# Patient Record
Sex: FEMALE | NOT HISPANIC OR LATINO | Employment: OTHER | URBAN - METROPOLITAN AREA
[De-identification: names, ages, dates, MRNs, and addresses within clinical notes are randomized per-mention and may not be internally consistent; named-entity substitution may affect disease eponyms.]

---

## 2017-01-03 ENCOUNTER — DOCUMENTATION ONLY (OUTPATIENT)
Dept: LAB | Facility: CLINIC | Age: 57
End: 2017-01-03

## 2017-01-03 ENCOUNTER — OFFICE VISIT (OUTPATIENT)
Dept: FAMILY MEDICINE | Facility: CLINIC | Age: 57
End: 2017-01-03
Payer: COMMERCIAL

## 2017-01-03 VITALS
BODY MASS INDEX: 38.44 KG/M2 | DIASTOLIC BLOOD PRESSURE: 63 MMHG | HEART RATE: 80 BPM | OXYGEN SATURATION: 97 % | WEIGHT: 203.6 LBS | SYSTOLIC BLOOD PRESSURE: 125 MMHG | TEMPERATURE: 98.3 F | HEIGHT: 61 IN

## 2017-01-03 DIAGNOSIS — G44.219 EPISODIC TENSION-TYPE HEADACHE, NOT INTRACTABLE: ICD-10-CM

## 2017-01-03 DIAGNOSIS — Z12.31 ENCOUNTER FOR SCREENING MAMMOGRAM FOR BREAST CANCER: ICD-10-CM

## 2017-01-03 DIAGNOSIS — E03.9 ACQUIRED HYPOTHYROIDISM: ICD-10-CM

## 2017-01-03 DIAGNOSIS — F43.21 SITUATIONAL DEPRESSION: ICD-10-CM

## 2017-01-03 DIAGNOSIS — R51.9 WORSENING HEADACHES: Primary | ICD-10-CM

## 2017-01-03 DIAGNOSIS — E03.9 ACQUIRED HYPOTHYROIDISM: Primary | ICD-10-CM

## 2017-01-03 DIAGNOSIS — F43.21 GRIEVING: ICD-10-CM

## 2017-01-03 LAB — FOLATE SERPL-MCNC: 17.9 NG/ML

## 2017-01-03 PROCEDURE — 99214 OFFICE O/P EST MOD 30 MIN: CPT | Performed by: FAMILY MEDICINE

## 2017-01-03 PROCEDURE — 82607 VITAMIN B-12: CPT | Performed by: FAMILY MEDICINE

## 2017-01-03 PROCEDURE — 84443 ASSAY THYROID STIM HORMONE: CPT | Performed by: FAMILY MEDICINE

## 2017-01-03 PROCEDURE — 99000 SPECIMEN HANDLING OFFICE-LAB: CPT | Performed by: FAMILY MEDICINE

## 2017-01-03 PROCEDURE — 82746 ASSAY OF FOLIC ACID SERUM: CPT | Performed by: FAMILY MEDICINE

## 2017-01-03 PROCEDURE — 84439 ASSAY OF FREE THYROXINE: CPT | Performed by: FAMILY MEDICINE

## 2017-01-03 PROCEDURE — 36415 COLL VENOUS BLD VENIPUNCTURE: CPT | Performed by: FAMILY MEDICINE

## 2017-01-03 RX ORDER — NAPROXEN 500 MG/1
500 TABLET ORAL 2 TIMES DAILY PRN
Qty: 30 TABLET | Refills: 1 | Status: SHIPPED | OUTPATIENT
Start: 2017-01-03 | End: 2018-06-12

## 2017-01-03 ASSESSMENT — ANXIETY QUESTIONNAIRES
GAD7 TOTAL SCORE: 17
2. NOT BEING ABLE TO STOP OR CONTROL WORRYING: NEARLY EVERY DAY
7. FEELING AFRAID AS IF SOMETHING AWFUL MIGHT HAPPEN: NEARLY EVERY DAY
5. BEING SO RESTLESS THAT IT IS HARD TO SIT STILL: SEVERAL DAYS
IF YOU CHECKED OFF ANY PROBLEMS ON THIS QUESTIONNAIRE, HOW DIFFICULT HAVE THESE PROBLEMS MADE IT FOR YOU TO DO YOUR WORK, TAKE CARE OF THINGS AT HOME, OR GET ALONG WITH OTHER PEOPLE: SOMEWHAT DIFFICULT
6. BECOMING EASILY ANNOYED OR IRRITABLE: MORE THAN HALF THE DAYS
1. FEELING NERVOUS, ANXIOUS, OR ON EDGE: MORE THAN HALF THE DAYS
3. WORRYING TOO MUCH ABOUT DIFFERENT THINGS: NEARLY EVERY DAY

## 2017-01-03 ASSESSMENT — PATIENT HEALTH QUESTIONNAIRE - PHQ9: 5. POOR APPETITE OR OVEREATING: NEARLY EVERY DAY

## 2017-01-03 NOTE — PATIENT INSTRUCTIONS
Managing Tension-type Headache Symptoms  A tension-type headache can develop slowly. Being aware of the symptoms helps you recognize a headache early. Then you can act to reduce pain and relieve tension. Methods for relieving your symptoms include self-care and medicine.    Tension-type symptoms  The earlier you recognize the symptoms of a tension-type headache, the easier it is to treat. Tension-type headaches may:    Begin with fatigue, tension, or pain in the neck and shoulders    Feel like a band around the head    Be concentrated in the temple, the back of the head, behind the eyes, or in the face    Come and go, or last for days, weeks, or even longer    Involve referred pain -- this means that the area that hurts may not be where the problem begins  Self-care during a tension-type headache  When you have a tension-type headache, there are things you can do to relax, loosen muscles, and reduce the pain:    Brush your scalp lightly with a soft hairbrush.    Give yourself a massage. Knead the muscles running from your shoulders up the back of your skull. Or ask a friend to gently massage your neck and shoulders.    Use an ice pack. Wrap a thin cloth around a cold pack, a cold can of soda, or a bag of frozen vegetables. Apply this directly to the place where you feel pain (such as your neck or temples).    Use moist heat to relax your muscles. Take a warm shower or bath. Or drape a warm, moist towel around your neck and shoulders.  Relieving pain and tension  Over-the-counter or prescription medicine can help relieve pain. Another way to reduce your pain is to use relaxation techniques to loosen tight muscles.  Medicine  Medicine used for tension-type headaches include the following:    NSAIDs (nonsteroidal anti-inflammatories), such as aspirin and ibuprofen, relieve inflammation and help block pain signals.    Acetaminophen treats pain, and some formulations contain caffeine.     Muscle relaxants can reduce  painful muscle contractions.  Taking medicine safely  Be aware that:    Taking analgesics (pain relievers) or drinking too much coffee may lead to rebound headaches (frequent or severe headaches that can happen if you miss a dose of medication), so take pain medications only as needed. If you think you have these headaches, contact your health care provider.    Taking too much medication can cause sleep problems or stomach upset. Some over-the-counter headache medications may contain caffeine. These may disrupt sleep and worsen pain.  Relaxation techniques  A , class, book, or tape may help you learn these techniques. One or more of these methods may work for you:    Deep breathing. Slow, calm, deep breathing can help you relax. Breathe in for a count of 5 or more. Then slowly let the breath out.    Visualization. Imagining a peaceful, secure scene can give you a sense of control over your body and surroundings.    Progressive relaxation. This is done by tightening and then releasing muscle groups. Start at the top of your head and work your way down your body. Tighten each muscle group for 5 to 10 seconds. Then release the muscle group for the same amount of time.    Biofeedback. This is a type of training in which you learn to control certain physical functions and responses. This helps you learn to reduce muscle tension.       5281-6284 [employer] [address] [place] [address], [place] [address]. All rights reserved. This information is not intended as a substitute for professional medical care. Always follow your healthcare professional's instructions.

## 2017-01-03 NOTE — PROGRESS NOTES
SUBJECTIVE:                                                    Aziza Adan is a 56 year old female who presents to clinic today for the following health issues:      Headache     Onset: ongoing over the past x1 year    Description:   Location: left side back of of head   Character: sharp/ shooting pain  Frequency:  Lasting about x1 minute   Duration:  qx2-3d    Intensity: 9/10    Progression of Symptoms:  same    Accompanying Signs & Symptoms:  Stiff neck: no  Neck or upper back pain: no  Fever: no  Sinus pressure: no  Nausea or vomiting: no  Dizziness: no  Numbness: YES; occasional- right hand ongoing over the past x1 year   Weakness: no  Visual changes: sees a black spot/ blurry at random on side of left eye   History:   Head trauma: no  Family history of migraines: no  Previous tests for headaches: no  Neurologist evaluations: no  Able to do daily activities: yes  Wake with a headaches: no  Do headaches wake you up: no  Daily pain medication use: no  Work/school stressors/changes: YES- death of daughter x1 year ago; committed suicide- pt thinks it may be related. Also lost a son    Precipitating factors:   Does light make it worse: no  Does sound make it worse: no    Alleviating factors:  Does sleep help: no         Therapies Tried and outcome: none      Patient admits that she has been depressed for the past 2 or more years. Does not want to show her emotions to her other children.   Afraid of coming across as weak. Denies having any suicidal ideations.  PHQ-9 ([employer]) 1/3/2017   1.  Little interest or pleasure in doing things 1   2.  Feeling down, depressed, or hopeless 3   3.  Trouble falling or staying asleep, or sleeping too much 3   4.  Feeling tired or having little energy 3   5.  Poor appetite or overeating 1   6.  Feeling bad about yourself 2   7.  Trouble concentrating 0   8.  Moving slowly or restless 0   9.  Suicidal or self-harm thoughts 0   PHQ-9 Total Score 13   Difficulty at work, home,  "or with people Somewhat difficult     DK-7   [employer], 2002; Used with Permission) 1/3/2017   1. Feeling nervous, anxious, or on edge 2   2. Not being able to stop or control worrying 3   3. Worrying too much about different things 3   4. Trouble relaxing 3   5. Being so restless that it is hard to sit still 1   6. Becoming easily annoyed or irritable 2   7. Feeling afraid, as if something awful might happen 3   DK-7 Total Score 17   If you checked any problems, how difficult have they made it for you to do your work, take care of things at home, or get along with other people? Somewhat difficult         Problem list and histories reviewed & adjusted, as indicated.  Additional history: as documented    Current Outpatient Prescriptions   Medication Sig Dispense Refill     naproxen (NAPROSYN) 500 MG tablet Take 1 tablet (500 mg) by mouth 2 times daily as needed for moderate pain Take with meals 30 tablet 1     levothyroxine (SYNTHROID, LEVOTHROID) 200 MCG tablet Take 1 tablet (200 mcg) by mouth daily 90 tablet 3     simvastatin (ZOCOR) 80 MG tablet Take 1 tablet (80 mg) by mouth At Bedtime 90 tablet 3     [DISCONTINUED] LEVOTHYROXINE SODIUM PO Take 200 mcg by mouth       [DISCONTINUED] SIMVASTATIN PO Take 80 mg by mouth       Allergies   Allergen Reactions     Niacin And Related      \"Shaking all over and turned red\".     Problem list, Medication list, Allergies, and Medical/Social/Surgical histories reviewed in [facility] and updated as appropriate.    ROS:  Other than noted above, general, HEENT, respiratory, cardiac and gastrointestinal systems are negative.      OBJECTIVE:                                                    /63 mmHg  Pulse 80  Temp(Src) 98.3  F (36.8  C) (Tympanic)  Ht 5' 1\" (1.549 m)  Wt 203 lb 9.6 oz (92.352 kg)  BMI 38.49 kg/m2  SpO2 97%  Breastfeeding? No  Body mass index is 38.49 kg/(m^2).  GENERAL: healthy, alert and no distress  RESP: lungs clear to auscultation - no rales, " rhonchi or wheezes  CV: regular rate and rhythm, normal S1 S2, no S3 or S4, no murmur, click or rub, no peripheral edema and peripheral pulses strong  NEURO: CN II -XII grossly intact. No neurologic deficits noted. DTR are present and equal bilaterally.      Diagnostic Test Results:  See orders below     ASSESSMENT/PLAN:                                                    Aziza was seen today for headache.    Diagnoses and all orders for this visit:    Worsening headaches  Since patient is > 50 years, will obtain an MRI to further evaluate  -     MR Brain w/o Contrast; Future    Episodic tension-type headache, not intractable  -     Trial naproxen (NAPROSYN) 500 MG tablet; Take 1 tablet (500 mg) by mouth 2 times daily as needed for moderate pain Take with meals  Avoid taking daily to prevent rebound headaches. Patient verbalized understanding.  Patient education and Handout given    Grieving > 1 year  -     Vitamin B12  -     Folate  -     MENTAL HEALTH REFERRAL    Situational depression/anxiety due to loss of daughter and son  -     MENTAL HEALTH REFERRAL    Encounter for screening mammogram for breast cancer  -     *MA Screening Digital Bilateral; Future    Follow up after results are back, sooner if needed.      Total time spent face to face was 30 min. Greater than 50% of the time was spent counseling and/OR coordination of care regarding today's complaints and symptoms and discussing the treatment plan as described immediately above.      Alicia Cárdenas MD  [facility]

## 2017-01-03 NOTE — MR AVS SNAPSHOT
After Visit Summary   1/3/2017    Aziza Adan    MRN: [id number]           Patient Information     Date Of Birth          1960        Visit Information        Provider Department      1/3/2017 2:00 PM Alicia Cárdenas MD [facility]        Today's Diagnoses     Worsening headaches    -  1     Episodic tension-type headache, not intractable         Grieving         Situational depression         Encounter for screening mammogram for breast cancer           Care Instructions      Managing Tension-type Headache Symptoms  A tension-type headache can develop slowly. Being aware of the symptoms helps you recognize a headache early. Then you can act to reduce pain and relieve tension. Methods for relieving your symptoms include self-care and medicine.    Tension-type symptoms  The earlier you recognize the symptoms of a tension-type headache, the easier it is to treat. Tension-type headaches may:    Begin with fatigue, tension, or pain in the neck and shoulders    Feel like a band around the head    Be concentrated in the temple, the back of the head, behind the eyes, or in the face    Come and go, or last for days, weeks, or even longer    Involve referred pain -- this means that the area that hurts may not be where the problem begins  Self-care during a tension-type headache  When you have a tension-type headache, there are things you can do to relax, loosen muscles, and reduce the pain:    Brush your scalp lightly with a soft hairbrush.    Give yourself a massage. Knead the muscles running from your shoulders up the back of your skull. Or ask a friend to gently massage your neck and shoulders.    Use an ice pack. Wrap a thin cloth around a cold pack, a cold can of soda, or a bag of frozen vegetables. Apply this directly to the place where you feel pain (such as your neck or temples).    Use moist heat to relax your muscles. Take a warm shower or bath. Or drape a warm, moist  towel around your neck and shoulders.  Relieving pain and tension  Over-the-counter or prescription medicine can help relieve pain. Another way to reduce your pain is to use relaxation techniques to loosen tight muscles.  Medicine  Medicine used for tension-type headaches include the following:    NSAIDs (nonsteroidal anti-inflammatories), such as aspirin and ibuprofen, relieve inflammation and help block pain signals.    Acetaminophen treats pain, and some formulations contain caffeine.     Muscle relaxants can reduce painful muscle contractions.  Taking medicine safely  Be aware that:    Taking analgesics (pain relievers) or drinking too much coffee may lead to rebound headaches (frequent or severe headaches that can happen if you miss a dose of medication), so take pain medications only as needed. If you think you have these headaches, contact your health care provider.    Taking too much medication can cause sleep problems or stomach upset. Some over-the-counter headache medications may contain caffeine. These may disrupt sleep and worsen pain.  Relaxation techniques  A , class, book, or tape may help you learn these techniques. One or more of these methods may work for you:    Deep breathing. Slow, calm, deep breathing can help you relax. Breathe in for a count of 5 or more. Then slowly let the breath out.    Visualization. Imagining a peaceful, secure scene can give you a sense of control over your body and surroundings.    Progressive relaxation. This is done by tightening and then releasing muscle groups. Start at the top of your head and work your way down your body. Tighten each muscle group for 5 to 10 seconds. Then release the muscle group for the same amount of time.    Biofeedback. This is a type of training in which you learn to control certain physical functions and responses. This helps you learn to reduce muscle tension.       6234-3937 [employer]. [address],  [place] [address]. All rights reserved. This information is not intended as a substitute for professional medical care. Always follow your healthcare professional's instructions.              Follow-ups after your visit        Additional Services     MENTAL HEALTH REFERRAL       Your provider has referred you to: Behavioral Healthcare Providers Intake Scheduling [phone]   [email or web address]    All scheduling is subject to the client's specific insurance plan & benefits, provider/location availability, and provider clinical specialities.  Please arrive 15 minutes early for your first appointment and bring your completed paperwork.    Please be aware that coverage of these services is subject to the terms and limitations of your health insurance plan.  Call member services at your health plan with any benefit or coverage questions.                  Follow-up notes from your care team     Return for Follow up after results are back.      Future tests that were ordered for you today     Open Future Orders        Priority Expected Expires Ordered    *MA Screening Digital Bilateral Routine  1/3/2018 1/3/2017    MR Brain w/o Contrast Routine  1/4/2018 1/3/2017            Who to contact     Normal or non-critical lab and imaging results will be communicated to you by [employer], letter or phone within 4 business days after the clinic has received the results. If you do not hear from us within 7 days, please contact the clinic through [employer] or phone. If you have a critical or abnormal lab result, we will notify you by phone as soon as possible.  Submit refill requests through [employer] or call your pharmacy and they will forward the refill request to us. Please allow 3 business days for your refill to be completed.          If you need to speak with a  for additional information , please call: [phone]             Additional Information About Your Visit        [employer] Information     Michael lets  "you send messages to your doctor, view your test results, renew your prescriptions, schedule appointments and more. To sign up, go to [email or web address] . Click on \"Log in\" on the left side of the screen, which will take you to the Welcome page. Then click on \"Sign up Now\" on the right side of the page.     You will be asked to enter the access code listed below, as well as some personal information. Please follow the directions to create your username and password.     Your access code is: [email or web address]  Expires: 4/3/2017  2:58 PM     Your access code will  in 90 days. If you need help or a new code, please call your [facility] clinic or [phone].        Care EveryWhere ID     This is your Care EveryWhere ID. This could be used by other organizations to access your [facility] medical records  [id number]        Your Vitals Were     Pulse Temperature Height BMI (Body Mass Index) Pulse Oximetry Breastfeeding?    80 98.3  F (36.8  C) (Tympanic) 5' 1\" (1.549 m) 38.49 kg/m2 97% No       Blood Pressure from Last 3 Encounters:   17 125/63   16 104/60   11 97/60    Weight from Last 3 Encounters:   17 203 lb 9.6 oz (92.352 kg)   16 198 lb (89.812 kg)              We Performed the Following     Folate     MENTAL HEALTH REFERRAL     Vitamin B12          Today's Medication Changes          These changes are accurate as of: 1/3/17  2:58 PM.  If you have any questions, ask your nurse or doctor.               Start taking these medicines.        Dose/Directions    naproxen 500 MG tablet   Commonly known as:  NAPROSYN   Used for:  Episodic tension-type headache, not intractable   Started by:  Alicia Cárdenas MD        Dose:  500 mg   Take 1 tablet (500 mg) by mouth 2 times daily as needed for moderate pain Take with meals   Quantity:  30 tablet   Refills:  1            Where to get your medicines      These medications were sent to [facility]/pharmacy #[id number] - [place], [place] - [address] [place] [address] AT " [address] [place] [address]     Phone:  [phone]    - naproxen 500 MG tablet             Primary Care Provider Office Phone # Fax #    Blanca Peoples NICOLAS [phone]       [facility] [address] [place] [address]        Thank you!     Thank you for choosing [facility] [place]  for your care. Our goal is always to provide you with excellent care. Hearing back from our patients is one way we can continue to improve our services. Please take a few minutes to complete the written survey that you may receive in the mail after your visit with us. Thank you!             Your Updated Medication List - Protect others around you: Learn how to safely use, store and throw away your medicines at www.disposemymeds.org.          This list is accurate as of: 1/3/17  2:58 PM.  Always use your most recent med list.                   Brand Name Dispense Instructions for use    levothyroxine 200 MCG tablet    SYNTHROID/LEVOTHROID    90 tablet    Take 1 tablet (200 mcg) by mouth daily       naproxen 500 MG tablet    NAPROSYN    30 tablet    Take 1 tablet (500 mg) by mouth 2 times daily as needed for moderate pain Take with meals       simvastatin 80 MG tablet    ZOCOR    90 tablet    Take 1 tablet (80 mg) by mouth At Bedtime

## 2017-01-03 NOTE — NURSING NOTE
"Chief Complaint   Patient presents with     Headache       Initial /63 mmHg  Pulse 80  Temp(Src) 98.3  F (36.8  C) (Tympanic)  Ht 5' 1\" (1.549 m)  Wt 203 lb 9.6 oz (92.352 kg)  BMI 38.49 kg/m2  SpO2 97%  Breastfeeding? No Estimated body mass index is 38.49 kg/(m^2) as calculated from the following:    Height as of this encounter: 5' 1\" (1.549 m).    Weight as of this encounter: 203 lb 9.6 oz (92.352 kg).  BP completed using cuff size: del Pan MA      "

## 2017-01-04 LAB
T4 FREE SERPL-MCNC: 1.57 NG/DL (ref 0.76–1.46)
TSH SERPL DL<=0.005 MIU/L-ACNC: 0.15 MU/L (ref 0.4–4)

## 2017-01-04 ASSESSMENT — PATIENT HEALTH QUESTIONNAIRE - PHQ9: SUM OF ALL RESPONSES TO PHQ QUESTIONS 1-9: 13

## 2017-01-04 ASSESSMENT — ANXIETY QUESTIONNAIRES: GAD7 TOTAL SCORE: 17

## 2017-01-06 LAB — VIT B12 SERPL-MCNC: 497 PG/ML (ref 193–986)

## 2017-01-09 DIAGNOSIS — E03.9 ACQUIRED HYPOTHYROIDISM: Primary | ICD-10-CM

## 2017-01-09 RX ORDER — LEVOTHYROXINE SODIUM 175 UG/1
175 TABLET ORAL DAILY
Qty: 60 TABLET | Refills: 0 | Status: SHIPPED | OUTPATIENT
Start: 2017-01-09 | End: 2017-03-27

## 2017-01-17 ENCOUNTER — RADIANT APPOINTMENT (OUTPATIENT)
Dept: MAMMOGRAPHY | Facility: CLINIC | Age: 57
End: 2017-01-17
Attending: FAMILY MEDICINE
Payer: COMMERCIAL

## 2017-01-17 DIAGNOSIS — Z12.31 VISIT FOR SCREENING MAMMOGRAM: ICD-10-CM

## 2017-01-17 PROCEDURE — G0202 SCR MAMMO BI INCL CAD: HCPCS | Mod: TC

## 2017-01-31 DIAGNOSIS — E78.5 HYPERLIPIDEMIA WITH TARGET LDL LESS THAN 160: ICD-10-CM

## 2017-01-31 NOTE — TELEPHONE ENCOUNTER
Panel Management Review      Patient has the following on her problem list: None      Composite cancer screening  Chart review shows that this patient is due/due soon for the following Mammogram and Colonoscopy  Summary:    Patient is due/failing the following:   COLONOSCOPY and MAMMOGRAM    Action needed:   Patient needs office visit for colonoscopy- mammo was completed 1/17/17.    Type of outreach:    Phone, left message for patient to call back.      Left message for patient to call back pod 2 line.([phone])      Questions for provider review:    None                                                                                                                                    Laura Pan MA       Chart routed to Care Team .

## 2017-01-31 NOTE — LETTER
[facility]  [address]  [place] [address]  [phone]        Aziza Adan  [address] [address]      February 27, 2017      Dear Aziza,      Refill request for Simvastatin received. You are on a on high dose Simvastatin. However, you are overdue for fasting labs. Will await results prior to simvastatin refill. You may need a lower dose.    Please schedule this visit at your earliest convenience.      Sincerely,    Dr. Cárdenas's Care Team

## 2017-02-20 RX ORDER — SIMVASTATIN 80 MG
80 TABLET ORAL AT BEDTIME
Qty: 90 TABLET | Refills: 3 | Status: CANCELLED | OUTPATIENT
Start: 2017-02-20

## 2017-02-20 NOTE — TELEPHONE ENCOUNTER
Lost FIT supplies- will return to clinic to  new supplies.      Dr. Cárdenas:  Pt is requesting refill.  Medication was pended- please advise.

## 2017-02-20 NOTE — TELEPHONE ENCOUNTER
Noted.   On high dose Simvastatin.   Overdue for a lipid profile. Will await results prior to Simvastatin refill. May need a lower dose.   Future lab order completed. Patient to schedule a fasting lab appt at earliest convenience.

## 2017-03-27 DIAGNOSIS — E03.9 ACQUIRED HYPOTHYROIDISM: ICD-10-CM

## 2017-03-27 DIAGNOSIS — E78.5 HYPERLIPIDEMIA WITH TARGET LDL LESS THAN 160: ICD-10-CM

## 2017-03-27 NOTE — TELEPHONE ENCOUNTER
Routing refill request to provider for review/approval because:  Labs out of range.          Notes Recorded by Alicia Cárdenas MD on 1/9/2017 at 12:33 PM  Patient called with results.   Normal folate and Vitamin B 12.   Low TSH with high free T4 on Levo 200 mcg/day.   Recommended to decrease dose and repeat dose in 4-6 weeks.   Patient verbalized understanding.  Rx sent for Levo 175 mcg/day and labs pended for future.

## 2017-03-27 NOTE — TELEPHONE ENCOUNTER
LEVOTHYROXINE     Last Written Prescription Date: 2-12-17  Last Quantity: 60, # refills: 0  Last Office Visit with [facility] [place], [facility] or [facility] prescribing provider: 1-3-17        TSH   Date Value Ref Range Status   01/03/2017 0.15 (L) 0.40 - 4.00 mU/L Final

## 2017-03-29 NOTE — TELEPHONE ENCOUNTER
Please let patient know to schedule a lab appt before refill is completed.   Future Lab order was previously completed

## 2017-03-29 NOTE — TELEPHONE ENCOUNTER
Spoke with pt and let her know she needs to come in for labs before refill. Made appointment for tomorrow at 9:15, informed she needs to be fasting. Pt verbalized understanding and appreciation for the call.   Jacki Mcdaniel RN

## 2017-03-30 DIAGNOSIS — E03.9 ACQUIRED HYPOTHYROIDISM: ICD-10-CM

## 2017-03-30 DIAGNOSIS — E78.5 HYPERLIPIDEMIA WITH TARGET LDL LESS THAN 160: ICD-10-CM

## 2017-03-30 LAB
CHOLEST SERPL-MCNC: 231 MG/DL
HDLC SERPL-MCNC: 46 MG/DL
LDLC SERPL CALC-MCNC: 157 MG/DL
NONHDLC SERPL-MCNC: 185 MG/DL
TRIGL SERPL-MCNC: 140 MG/DL
TSH SERPL DL<=0.005 MIU/L-ACNC: 0.64 MU/L (ref 0.4–4)

## 2017-03-30 PROCEDURE — 36415 COLL VENOUS BLD VENIPUNCTURE: CPT | Performed by: FAMILY MEDICINE

## 2017-03-30 PROCEDURE — 80061 LIPID PANEL: CPT | Performed by: FAMILY MEDICINE

## 2017-03-30 PROCEDURE — 84443 ASSAY THYROID STIM HORMONE: CPT | Performed by: FAMILY MEDICINE

## 2017-03-31 RX ORDER — LEVOTHYROXINE SODIUM 175 UG/1
175 TABLET ORAL DAILY
Qty: 90 TABLET | Refills: 3 | Status: SHIPPED | OUTPATIENT
Start: 2017-03-31 | End: 2018-03-29

## 2017-04-02 ENCOUNTER — TRANSFERRED RECORDS (OUTPATIENT)
Dept: HEALTH INFORMATION MANAGEMENT | Facility: CLINIC | Age: 57
End: 2017-04-02

## 2017-04-04 RX ORDER — PRAVASTATIN SODIUM 40 MG
40 TABLET ORAL DAILY
Qty: 90 TABLET | Refills: 3 | Status: SHIPPED | OUTPATIENT
Start: 2017-04-04 | End: 2018-04-02

## 2017-04-12 NOTE — PATIENT INSTRUCTIONS
Schedule fasting lab in 3 months to recheck Cholesterol.     Preventive Health Recommendations  Female Ages 50 - 64    Yearly exam: See your health care provider every year in order to  o Review health changes.   o Discuss preventive care.    o Review your medicines if your doctor has prescribed any.      Get a Pap test every three years (unless you have an abnormal result and your provider advises testing more often).    If you get Pap tests with HPV test, you only need to test every 5 years, unless you have an abnormal result.     You do not need a Pap test if your uterus was removed (hysterectomy) and you have not had cancer.    You should be tested each year for STDs (sexually transmitted diseases) if you're at risk.     Have a mammogram every 1 to 2 years.    Have a colonoscopy at age 50, or have a yearly FIT test (stool test). These exams screen for colon cancer.      Have a cholesterol test every 5 years, or more often if advised.    Have a diabetes test (fasting glucose) every three years. If you are at risk for diabetes, you should have this test more often.     If you are at risk for osteoporosis (brittle bone disease), think about having a bone density scan (DEXA).    Shots: Get a flu shot each year. Get a tetanus shot every 10 years.    Nutrition:     Eat at least 5 servings of fruits and vegetables each day.    Eat whole-grain bread, whole-wheat pasta and brown rice instead of white grains and rice.    Talk to your provider about Calcium and Vitamin D.     Lifestyle    Exercise at least 150 minutes a week (30 minutes a day, 5 days a week). This will help you control your weight and prevent disease.    Limit alcohol to one drink per day.    No smoking.     Wear sunscreen to prevent skin cancer.     See your dentist every six months for an exam and cleaning.    See your eye doctor every 1 to 2 years.

## 2017-04-12 NOTE — PROGRESS NOTES
SUBJECTIVE:     CC: Aziza Adan is an 56 year old woman who presents for preventive health visit.     Healthy Habits:    Do you get at least three servings of calcium containing foods daily (dairy, green leafy vegetables, etc.)? yes    Amount of exercise or daily activities, outside of work: 2-3 day(s) per week    Problems taking medications regularly No    Medication side effects: No    Have you had an eye exam in the past two years? yes    Do you see a dentist twice per year? yes    Do you have sleep apnea, excessive snoring or daytime drowsiness?no      Would like to discuss referral for nutritionist . Interested in losing weight.     Wt Readings from Last 4 Encounters:   04/13/17 201 lb 3.2 oz (91.3 kg)   01/03/17 203 lb 9.6 oz (92.4 kg)   01/29/16 198 lb (89.8 kg)         Patient informed that anything we discuss that is not related to preventative medicine, may be billed for; patient verbalizes understanding.      Today's PHQ-2 Score:   PHQ-2 ( 1999 [employer]) 4/12/2017 1/29/2016   Q1: Little interest or pleasure in doing things 1 3   Q2: Feeling down, depressed or hopeless 1 3   PHQ-2 Score 2 6       Abuse: Current or Past(Physical, Sexual or Emotional)- No  Do you feel safe in your environment - Yes    Social History   Substance Use Topics     Smoking status: Never Smoker     Smokeless tobacco: Never Used     Alcohol use No     The patient does not drink >3 drinks per day nor >7 drinks per week.    Recent Labs   Lab Test  03/30/17   0906  01/29/16   1110   CHOL  231*  155   HDL  46*  44*   LDL  157*  92   TRIG  140  96   NHDL  185*  111       Reviewed orders with patient.  Reviewed health maintenance and updated orders accordingly - Yes    Mammo Decision Support:  Patient over age 50, mutual decision to screen reflected in health maintenance.    Pertinent mammograms are reviewed under the imaging tab.  History of abnormal Pap smear:   NO - age 30- 65 PAP every 3 years recommended  Last 3 Pap  Results:   PAP (no units)   Date Value   2016 NIL       Reviewed and updated as needed this visit by clinical staff  Tobacco  Allergies  Meds  Problems         Reviewed and updated as needed this visit by Provider  Allergies  Meds  Problems        Past Medical History:   Diagnosis Date     Adjustment disorder with depressed mood      Hyperlipidemia LDL goal <130      Hypothyroidism (acquired)       Past Surgical History:   Procedure Laterality Date     NO HISTORY OF SURGERY       Obstetric History      T0      TAB0   SAB0   E0   M0   L8       # Outcome Date GA Lbr Og/2nd Weight Sex Delivery Anes PTL Lv   10 Para            9 Para            8 Para            7 Para            6 Para            5 Para            4 Para            3 Para            2 Para            1 Para                   ROS:  C: NEGATIVE for fever, chills, change in weight  I: NEGATIVE for worrisome rashes, moles or lesions  E: NEGATIVE for vision changes or irritation  ENT: NEGATIVE for ear, mouth and throat problems  R: NEGATIVE for significant cough or SOB  B: NEGATIVE for masses, tenderness or discharge  CV: NEGATIVE for chest pain, palpitations or peripheral edema  GI: NEGATIVE for nausea, abdominal pain, heartburn, or change in bowel habits  : NEGATIVE for unusual urinary or vaginal symptoms. Periods are regular.  M: NEGATIVE for significant arthralgias or myalgia  N: NEGATIVE for weakness, dizziness or paresthesias  P: NEGATIVE for changes in mood or affect    Current Outpatient Prescriptions   Medication Sig Dispense Refill     pravastatin (PRAVACHOL) 40 MG tablet Take 1 tablet (40 mg) by mouth daily 90 tablet 3     levothyroxine (SYNTHROID/LEVOTHROID) 175 MCG tablet Take 1 tablet (175 mcg) by mouth daily 90 tablet 3     naproxen (NAPROSYN) 500 MG tablet Take 1 tablet (500 mg) by mouth 2 times daily as needed for moderate pain Take with meals (Patient not taking: Reported on 2017) 30 tablet 1  "    Allergies   Allergen Reactions     Niacin And Related      \"Shaking all over and turned red\".     OBJECTIVE:     /76  Pulse 69  Temp 98.5  F (36.9  C) (Tympanic)  Ht 5' 1\" (1.549 m)  Wt 201 lb 3.2 oz (91.3 kg)  SpO2 97%  Breastfeeding? No  BMI 38.02 kg/m2  EXAM:  GENERAL: healthy, alert and no distress  EYES: Eyes grossly normal to inspection, PERRL and conjunctivae and sclerae normal  HENT: ear canals and TM's normal, nose and mouth without ulcers or lesions  NECK: no adenopathy, no asymmetry, masses, or scars and thyroid normal to palpation  RESP: lungs clear to auscultation - no rales, rhonchi or wheezes  BREAST: normal without masses, tenderness or nipple discharge and no palpable axillary masses or adenopathy  CV: regular rate and rhythm, normal S1 S2, no S3 or S4, no murmur, click or rub, no peripheral edema and peripheral pulses strong  ABDOMEN: soft, nontender, no hepatosplenomegaly, no masses and bowel sounds normal  MS: no gross musculoskeletal defects noted, no edema  SKIN: no suspicious lesions or rashes  NEURO: Normal strength and tone, mentation intact and speech normal  PSYCH: mentation appears normal, affect normal/bright    DATA  Reviewed and discussed with patient prior to discharge.  Results for orders placed or performed in visit on 03/30/17   TSH with free T4 reflex   Result Value Ref Range    TSH 0.64 0.40 - 4.00 mU/L   Lipid Profile   Result Value Ref Range    Cholesterol 231 (H) <200 mg/dL    Triglycerides 140 <150 mg/dL    HDL Cholesterol 46 (L) >49 mg/dL    LDL Cholesterol Calculated 157 (H) <100 mg/dL    Non HDL Cholesterol 185 (H) <130 mg/dL       ASSESSMENT/PLAN:     Aziza was seen today for physical.    Diagnoses and all orders for this visit:    Routine general medical examination at a health care facility    Need for hepatitis C screening test  -     **Hepatitis C Screen Reflex to RNA FUTURE anytime    Screen for colon cancer  -     Fecal colorectal cancer screen " "(FIT); Future    Screening for diabetes mellitus  -     Hemoglobin A1c    Hyperlipidemia LDL goal <130, on Pravastatin  -     Lipid Profile; Future in 3 months   -     Comprehensive metabolic panel; Future    History of vitamin D deficiency  -     Vitamin D Deficiency    Adjustment disorder with Depressed mood.   Recently completed PHQ/DK 7, see Epic for details  Declines Psychotherapy counseling or pharmacotherapy.   States that she is dealing with it by encouraging herself with [employer]      Obesity with body mass index of 30.0-39.9  Weight management plan: Discussed healthy diet and exercise guidelines and patient will follow up in 6 months in clinic to re-evaluate. Nutrition referral completed  -     NUTRITION REFERRAL    Advanced directives, counseling/discussion  -     HONORING CHOICES REFERRAL              COUNSELING:   Reviewed preventive health counseling, as reflected in patient instructions       Regular exercise       Healthy diet/nutrition         reports that she has never smoked. She has never used smokeless tobacco.    Estimated body mass index is 38.02 kg/(m^2) as calculated from the following:    Height as of this encounter: 5' 1\" (1.549 m).    Weight as of this encounter: 201 lb 3.2 oz (91.3 kg).   Weight management plan: Discussed healthy diet and exercise guidelines and patient will follow up in 6 months in clinic to re-evaluate. Nutrition referral completed    Counseling Resources:  ATP IV Guidelines  Pooled Cohorts Equation Calculator  Breast Cancer Risk Calculator  FRAX Risk Assessment  ICSI Preventive Guidelines  Dietary Guidelines for Americans, 2010  USDA's MyPlate  ASA Prophylaxis  Lung CA Screening    Follow up annually and as needed thoughout the year.    Alicia Cárdenas MD  [facility] [place]  "

## 2017-04-13 ENCOUNTER — OFFICE VISIT (OUTPATIENT)
Dept: FAMILY MEDICINE | Facility: CLINIC | Age: 57
End: 2017-04-13
Payer: COMMERCIAL

## 2017-04-13 VITALS
SYSTOLIC BLOOD PRESSURE: 121 MMHG | OXYGEN SATURATION: 97 % | DIASTOLIC BLOOD PRESSURE: 76 MMHG | HEART RATE: 69 BPM | HEIGHT: 61 IN | TEMPERATURE: 98.5 F | WEIGHT: 201.2 LBS | BODY MASS INDEX: 37.99 KG/M2

## 2017-04-13 DIAGNOSIS — E78.5 HYPERLIPIDEMIA LDL GOAL <130: ICD-10-CM

## 2017-04-13 DIAGNOSIS — Z11.59 NEED FOR HEPATITIS C SCREENING TEST: ICD-10-CM

## 2017-04-13 DIAGNOSIS — Z71.89 ADVANCED DIRECTIVES, COUNSELING/DISCUSSION: ICD-10-CM

## 2017-04-13 DIAGNOSIS — E66.9 OBESITY WITH BODY MASS INDEX OF 30.0-39.9: ICD-10-CM

## 2017-04-13 DIAGNOSIS — Z86.39 HISTORY OF VITAMIN D DEFICIENCY: ICD-10-CM

## 2017-04-13 DIAGNOSIS — Z13.1 SCREENING FOR DIABETES MELLITUS: ICD-10-CM

## 2017-04-13 DIAGNOSIS — F43.21 ADJUSTMENT DISORDER WITH DEPRESSED MOOD: ICD-10-CM

## 2017-04-13 DIAGNOSIS — Z12.11 SCREEN FOR COLON CANCER: ICD-10-CM

## 2017-04-13 DIAGNOSIS — Z00.00 ROUTINE GENERAL MEDICAL EXAMINATION AT A HEALTH CARE FACILITY: Primary | ICD-10-CM

## 2017-04-13 LAB — HBA1C MFR BLD: 5.4 % (ref 4.3–6)

## 2017-04-13 PROCEDURE — 99396 PREV VISIT EST AGE 40-64: CPT | Performed by: FAMILY MEDICINE

## 2017-04-13 PROCEDURE — 82306 VITAMIN D 25 HYDROXY: CPT | Performed by: FAMILY MEDICINE

## 2017-04-13 PROCEDURE — 36415 COLL VENOUS BLD VENIPUNCTURE: CPT | Performed by: FAMILY MEDICINE

## 2017-04-13 PROCEDURE — 83036 HEMOGLOBIN GLYCOSYLATED A1C: CPT | Performed by: FAMILY MEDICINE

## 2017-04-13 PROCEDURE — 86803 HEPATITIS C AB TEST: CPT | Performed by: FAMILY MEDICINE

## 2017-04-13 NOTE — MR AVS SNAPSHOT
After Visit Summary   4/13/2017    Aziza Adan    MRN: [id number]           Patient Information     Date Of Birth          1960        Visit Information        Provider Department      4/13/2017 10:30 AM Alicia Cárdenas MD [facility] [place]        Today's Diagnoses     Routine general medical examination at a health care facility    -  1    Need for hepatitis C screening test        Screen for colon cancer        Screening for diabetes mellitus        Hyperlipidemia LDL goal <130        History of vitamin D deficiency        Obesity with body mass index of 30.0-39.9        Advanced directives, counseling/discussion        Adjustment disorder with depressed mood          Care Instructions    Schedule fasting lab in 3 months to recheck Cholesterol.     Preventive Health Recommendations  Female Ages 50 - 64    Yearly exam: See your health care provider every year in order to  o Review health changes.   o Discuss preventive care.    o Review your medicines if your doctor has prescribed any.      Get a Pap test every three years (unless you have an abnormal result and your provider advises testing more often).    If you get Pap tests with HPV test, you only need to test every 5 years, unless you have an abnormal result.     You do not need a Pap test if your uterus was removed (hysterectomy) and you have not had cancer.    You should be tested each year for STDs (sexually transmitted diseases) if you're at risk.     Have a mammogram every 1 to 2 years.    Have a colonoscopy at age 50, or have a yearly FIT test (stool test). These exams screen for colon cancer.      Have a cholesterol test every 5 years, or more often if advised.    Have a diabetes test (fasting glucose) every three years. If you are at risk for diabetes, you should have this test more often.     If you are at risk for osteoporosis (brittle bone disease), think about having a bone density scan (DEXA).    Shots: Get a  flu shot each year. Get a tetanus shot every 10 years.    Nutrition:     Eat at least 5 servings of fruits and vegetables each day.    Eat whole-grain bread, whole-wheat pasta and brown rice instead of white grains and rice.    Talk to your provider about Calcium and Vitamin D.     Lifestyle    Exercise at least 150 minutes a week (30 minutes a day, 5 days a week). This will help you control your weight and prevent disease.    Limit alcohol to one drink per day.    No smoking.     Wear sunscreen to prevent skin cancer.     See your dentist every six months for an exam and cleaning.    See your eye doctor every 1 to 2 years.          Follow-ups after your visit        Additional Services     [employer] REFERRAL       Your provider has referred you to [facility] Advance Care Planning Facilitator or Serious illness clinic support staff. The facilitator or support staff will contact you to schedule the appointment or for the follow up call    Reason for Referral: Basic Advance Care Planning - 1:1 need            NUTRITION REFERRAL       Your provider has referred you to: [facility]: [facility] [place] [phone]   [email or web address]    Please be aware that coverage of these services is subject to the terms and limitations of your health insurance plan.  Call member services at your health plan with any benefit or coverage questions.      Please bring the following with you to your appointment:    (1) This referral request  (2) Any documents given to you regarding this referral  (3) Any specific questions you have about diet and/or food choices                  Follow-up notes from your care team     Return in about 1 year (around 4/13/2018) for Physical Exam and as needed throughout the year.      Future tests that were ordered for you today     Open Future Orders        Priority Expected Expires Ordered    Comprehensive metabolic panel Routine  4/13/2018 4/13/2017  "   Lipid Profile Routine  2018    Fecal colorectal cancer screen (FIT) Routine 2017            Who to contact     Normal or non-critical lab and imaging results will be communicated to you by [employer], letter or phone within 4 business days after the clinic has received the results. If you do not hear from us within 7 days, please contact the clinic through [employer] or phone. If you have a critical or abnormal lab result, we will notify you by phone as soon as possible.  Submit refill requests through [employer] or call your pharmacy and they will forward the refill request to us. Please allow 3 business days for your refill to be completed.          If you need to speak with a  for additional information , please call: [phone]             Additional Information About Your Visit        Trip[employer] Information     [employer] lets you send messages to your doctor, view your test results, renew your prescriptions, schedule appointments and more. To sign up, go to [email or web address] . Click on \"Log in\" on the left side of the screen, which will take you to the Welcome page. Then click on \"Sign up Now\" on the right side of the page.     You will be asked to enter the access code listed below, as well as some personal information. Please follow the directions to create your username and password.     Your access code is: [email or web address]  Expires: 2017 11:23 AM     Your access code will  in 90 days. If you need help or a new code, please call your [facility] clinic or [phone].        Care EveryWhere ID     This is your Care EveryWhere ID. This could be used by other organizations to access your [facility] medical records  [id number]        Your Vitals Were     Pulse Temperature Height Pulse Oximetry Breastfeeding? BMI (Body Mass Index)    69 98.5  F (36.9  C) (Tympanic) 5' 1\" (1.549 m) 97% No 38.02 kg/m2       Blood Pressure from Last 3 Encounters:   17 " 121/76   01/03/17 125/63   01/29/16 104/60    Weight from Last 3 Encounters:   04/13/17 201 lb 3.2 oz (91.3 kg)   01/03/17 203 lb 9.6 oz (92.4 kg)   01/29/16 198 lb (89.8 kg)              We Performed the Following     **Hepatitis C Screen Reflex to RNA FUTURE anytime     Hemoglobin A1c     HONORING CHOICES REFERRAL     NUTRITION REFERRAL     Vitamin D Deficiency        Primary Care Provider Office Phone # Fax #    Alicia Cárdenas [phone]       [facility] [address]  [place] [address]        Thank you!     Thank you for choosing [facility]  for your care. Our goal is always to provide you with excellent care. Hearing back from our patients is one way we can continue to improve our services. Please take a few minutes to complete the written survey that you may receive in the mail after your visit with us. Thank you!             Your Updated Medication List - Protect others around you: Learn how to safely use, store and throw away your medicines at www.disposemymeds.org.          This list is accurate as of: 4/13/17 11:23 AM.  Always use your most recent med list.                   Brand Name Dispense Instructions for use    levothyroxine 175 MCG tablet    SYNTHROID/LEVOTHROID    90 tablet    Take 1 tablet (175 mcg) by mouth daily       naproxen 500 MG tablet    NAPROSYN    30 tablet    Take 1 tablet (500 mg) by mouth 2 times daily as needed for moderate pain Take with meals       pravastatin 40 MG tablet    PRAVACHOL    90 tablet    Take 1 tablet (40 mg) by mouth daily

## 2017-04-13 NOTE — LETTER
[facility] [place]  [address]  [place] [address]  [phone]        April 14, 2017      Aziza Adan  [address]   [place] [address]        Dear Aziza,      Your recent labs came back normal.   No evidence of diabetes at this time.   Vitamin D level was normal.   Hepatitis C screen was negative.     Results for orders placed or performed in visit on 04/13/17   **Hepatitis C Screen Reflex to RNA FUTURE anytime   Result Value Ref Range    Hepatitis C Antibody  NR     Nonreactive   Assay performance characteristics have not been established for newborns,   infants, and children     Hemoglobin A1c   Result Value Ref Range    Hemoglobin A1C 5.4 4.3 - 6.0 %   Vitamin D Deficiency   Result Value Ref Range    Vitamin D Deficiency screening 28 20 - 75 ug/L       If you have any questions or concerns, please call myself or my nurse at [phone].    Sincerely,    Alicia Cárdenas M.D/[facility]

## 2017-04-13 NOTE — NURSING NOTE
"Chief Complaint   Patient presents with     Physical       Initial /76  Pulse 69  Temp 98.5  F (36.9  C) (Tympanic)  Ht 5' 1\" (1.549 m)  Wt 201 lb 3.2 oz (91.3 kg)  SpO2 97%  Breastfeeding? No  BMI 38.02 kg/m2 Estimated body mass index is 38.02 kg/(m^2) as calculated from the following:    Height as of this encounter: 5' 1\" (1.549 m).    Weight as of this encounter: 201 lb 3.2 oz (91.3 kg).  Medication Reconciliation: complete     Laura Pan MA      "

## 2017-04-14 LAB
DEPRECATED CALCIDIOL+CALCIFEROL SERPL-MC: 28 UG/L (ref 20–75)
HCV AB SERPL QL IA: NORMAL

## 2017-04-14 NOTE — PROGRESS NOTES
Please send a result letter on clinic letterhead with results along with the following instructions      Ms. Adan      Your recent labs came back normal.   No evidence of diabetes at this time.   Vitamin D level was normal.   Hepatitis C screen was negative.     Please contact the clinic if you have any additional questions or concerns.    Sincerely,      Alicia Cárdenas M.D    [facility]

## 2017-05-04 ENCOUNTER — ALLIED HEALTH/NURSE VISIT (OUTPATIENT)
Dept: EDUCATION SERVICES | Facility: CLINIC | Age: 57
End: 2017-05-04
Payer: COMMERCIAL

## 2017-05-04 VITALS — WEIGHT: 202.6 LBS | BODY MASS INDEX: 38.28 KG/M2

## 2017-05-04 DIAGNOSIS — E78.5 HYPERLIPIDEMIA LDL GOAL <130: ICD-10-CM

## 2017-05-04 DIAGNOSIS — E66.9 OBESITY WITH BODY MASS INDEX OF 30.0-39.9: Primary | ICD-10-CM

## 2017-05-04 PROCEDURE — 97802 MEDICAL NUTRITION INDIV IN: CPT

## 2017-05-04 NOTE — PATIENT INSTRUCTIONS
Consider looking into [employer] Milk as this is lactose free and has more protein/less sugar    Work on cutting back on your bread intake. If you are still hungry, try having more vegetables or more lean protein     Try to eat consistently and do not go more than 5 hours without eating something     Ramadan: drink a lot of water, eat a lot of vegetables, choose lean proteins, limit high fat/high sugar items     FOLLOW-UP: Tuesday July 11th at 11:30 am at the [facility]

## 2017-05-04 NOTE — PROGRESS NOTES
"Medical Nutrition Therapy  Visit Type:Initial assessment and intervention    Aziza Adan presents today for MNT and education related to weight management.   She is accompanied by self.     ASSESSMENT:   Patient comments/concerns relating to nutrition: \"I need to lose weight. I have no motivation to exercise and I have been very depressed since my daughter passed away 1.5 years ago\"   Pt reports a lot of depression and feels this is a barrier to weight loss and exercise. She is not sleeping well, reports an average of 5-6 hours/night. She feels she needs to cut back on her bread intake. She has been getting a lot of nutrition information on [employer] and [employer].   She has 10 children the youngest one is 23 years old. She stated she was very thin prior to having her children, around 123#.     NUTRITION HISTORY:  Is not motivated to make meals but does make all food homemade. Reports finances are a barrier. Doesn't always eat consistently and doesn't often feel like eating. Was unable to provide a food recall as food intake is so inconsistent.   She is currently drinking a beverage made with apple cider vinegar/tumeric/talita and cinnamon in effort to promote weight loss. She feels it is helping her sleep better.   Snacks: 1 piece of bread + cheese   Beverages: Tea- Meri with kim/day, Coffee 1-3 cups/day and Water throughout/day. Doesn't use sugar but does use cream in coffee.     Misses meals? Often   Eats out:  seldom     Previous diet education:  Yes- saw an RD in [facility] a few years ago. Reported going to appointments twice/week where she was strapped to something and would sweat a lot and she lost a lot of weight from this. Uncertain what she is referring to.     Food allergies/intolerances: lactose-bloated     Diet is low in: calories, carbs and fiber    EXERCISE: used to walk a lot when she was younger  Has a treadmill- does 10 minutes in the morning     SOCIO/ECONOMIC:   Lives with: 2 sons and daughter " in law.     MEDICATIONS:  Current Outpatient Prescriptions   Medication     pravastatin (PRAVACHOL) 40 MG tablet     levothyroxine (SYNTHROID/LEVOTHROID) 175 MCG tablet     naproxen (NAPROSYN) 500 MG tablet     No current facility-administered medications for this visit.        LABS:  Last Basic Metabolic Panel:  Lab Results   Component Value Date     01/29/2016      Lab Results   Component Value Date    POTASSIUM 4.1 01/29/2016     Lab Results   Component Value Date    CHLORIDE 105 01/29/2016     Lab Results   Component Value Date    ALEAH 8.4 01/29/2016     Lab Results   Component Value Date    CO2 29 01/29/2016     Lab Results   Component Value Date    BUN 14 01/29/2016     Lab Results   Component Value Date    CR 0.72 01/29/2016     Lab Results   Component Value Date    GLC 91 01/29/2016       ANTHROPOMETRICS:  Vitals: Wt 202 lb 9.6 oz (91.9 kg)  BMI 38.28 kg/m2  Body mass index is 38.28 kg/(m^2).      Wt Readings from Last 5 Encounters:   04/13/17 201 lb 3.2 oz (91.3 kg)   01/03/17 203 lb 9.6 oz (92.4 kg)   01/29/16 198 lb (89.8 kg)       Weight Change: stable    ESTIMATED KCAL REQUIREMENTS:  1200 kcal per day    NUTRITION DIAGNOSIS: Overweight/Obesity related to inconsistent intake and lack of motivation to exercise 2/2 depression as evidenced by no structured activity and current BMI of 38.28kg/m2    NUTRITION INTERVENTION:  Nutrition Prescription: Energy Intake: 1200 kcal/day  Education given to support: general nutrition guidelines, weight reduction, consistent meals, labeling, exercise, behavior modification and portion control. Encouraged her to eat consistently and discussed how to balance intake. Recommended treating exercise as a way to cope with stress, encouraged her to start small. She would benefit from decreased bread intake and increased vegetable intake. Encouraged 6-8 glasses of water daily. Consider tracking intake on My Fitness Pal or pen/paper. Calorie counting not discussed  today.  Education Materials Provided: My Plate Planner/Choose My Plate, Label Reading, 100 Calorie Snacks, Weight Loss Tips and Simple Tips for Quick and Balanced Meals   Motivational Interviewing  Referral to care team member: Health  offered, pt declined at this time though stated she will consider in the future if she feels it would be beneficial    PATIENT'S BEHAVIOR CHANGE GOALS:   See Patient Instructions for patient stated behavior change goals. AVS was printed and given to patient at today's appointment.    MONITOR / EVALUATE:  RD will monitor/evaluate:  Food and nutrition knowledge / skills  Food / Beverage / Nutrient intake   Progress toward meeting stated nutrition-related goals  Readiness to change nutrition-related behaviors  Weight change    FOLLOW-UP:  Follow-up appointment scheduled on July 11. Encouraged patient to check with insurance regarding coverage of visits with SHERINE Alexis RD, LD, CDE    Time spent in minutes: 45  Encounter: Individual

## 2017-05-04 NOTE — MR AVS SNAPSHOT
After Visit Summary   5/4/2017    Aziza Adan    MRN: [id number]           Patient Information     Date Of Birth          1960        Visit Information        Provider Department      5/4/2017 1:00 PM BE DIABETIC ED RESOURCE [facility]3] [place]        Care Instructions    Consider looking into Fairlife Milk as this is lactose free and has more protein/less sugar    Work on cutting back on your bread intake. If you are still hungry, try having more vegetables or more lean protein     Try to eat consistently and do not go more than 5 hours without eating something     Ramadan: drink a lot of water, eat a lot of vegetables, choose lean proteins, limit high fat/high sugar items     FOLLOW-UP: Tuesday July 11th at 11:30 am at the [facility]        Follow-ups after your visit        Your next 10 appointments already scheduled     Jul 11, 2017 11:30 AM CDT   Diabetic Education with BE DIABETIC ED RESOURCE   [facility]3] [place] ([facility])    [address]  [place] [address] [phone]              Who to contact     If you have questions or need follow up information about today's clinic visit or your schedule please contact [facility] directly at [phone].  Normal or non-critical lab and imaging results will be communicated to you by Pulse Entertainmenthart, letter or phone within 4 business days after the clinic has received the results. If you do not hear from us within 7 days, please contact the clinic through Pulse Entertainmenthart or phone. If you have a critical or abnormal lab result, we will notify you by phone as soon as possible.  Submit refill requests through Individual Digital or call your pharmacy and they will forward the refill request to us. Please allow 3 business days for your refill to be completed.          Additional Information About Your Visit        Pulse EntertainmentharQgiv Information     Individual Digital lets you send messages to your doctor, view your test results, renew  "your prescriptions, schedule appointments and more. To sign up, go to [email or web address] . Click on \"Log in\" on the left side of the screen, which will take you to the Welcome page. Then click on \"Sign up Now\" on the right side of the page.     You will be asked to enter the access code listed below, as well as some personal information. Please follow the directions to create your username and password.     Your access code is: [email or web address]  Expires: 2017 11:23 AM     Your access code will  in 90 days. If you need help or a new code, please call your [place] clinic or [phone].        Care EveryWhere ID     This is your Care EveryWhere ID. This could be used by other organizations to access your [place] medical records  [id number]         Blood Pressure from Last 3 Encounters:   17 121/76   17 125/63   16 104/60    Weight from Last 3 Encounters:   17 201 lb 3.2 oz (91.3 kg)   17 203 lb 9.6 oz (92.4 kg)   16 198 lb (89.8 kg)              Today, you had the following     No orders found for display       Primary Care Provider Office Phone # Fax #    Alicia Cárdenas [phone]       [facility] [address]  [facility] [address]        Thank you!     Thank you for choosing [facility]  for your care. Our goal is always to provide you with excellent care. Hearing back from our patients is one way we can continue to improve our services. Please take a few minutes to complete the written survey that you may receive in the mail after your visit with us. Thank you!             Your Updated Medication List - Protect others around you: Learn how to safely use, store and throw away your medicines at www.disposemymeds.org.          This list is accurate as of: 17  1:48 PM.  Always use your most recent med list.                   Brand Name Dispense Instructions for use    levothyroxine 175 MCG tablet    " SYNTHROID/LEVOTHROID    90 tablet    Take 1 tablet (175 mcg) by mouth daily       naproxen 500 MG tablet    NAPROSYN    30 tablet    Take 1 tablet (500 mg) by mouth 2 times daily as needed for moderate pain Take with meals       pravastatin 40 MG tablet    PRAVACHOL    90 tablet    Take 1 tablet (40 mg) by mouth daily

## 2017-07-18 ENCOUNTER — TELEPHONE (OUTPATIENT)
Dept: FAMILY MEDICINE | Facility: CLINIC | Age: 57
End: 2017-07-18

## 2017-07-18 NOTE — TELEPHONE ENCOUNTER
Panel Management Review      Patient has the following on her problem list: None      Composite cancer screening  Chart review shows that this patient is due/due soon for the following Fecal Colorectal (FIT)  Summary:    Patient is due/failing the following:   FIT    Action needed:   Pt needs to return FIT supplies    Type of outreach:    Phone, spoke to patient.  Pt states she forgot but still has her supplies and will return stool sample into clinic this week.    Questions for provider review:    None                                                                                                                                    Laura Pan MA       Chart routed to Care Team .

## 2018-03-19 ENCOUNTER — OFFICE VISIT (OUTPATIENT)
Dept: FAMILY MEDICINE | Facility: CLINIC | Age: 58
End: 2018-03-19
Payer: COMMERCIAL

## 2018-03-19 VITALS
BODY MASS INDEX: 40.22 KG/M2 | SYSTOLIC BLOOD PRESSURE: 128 MMHG | DIASTOLIC BLOOD PRESSURE: 69 MMHG | WEIGHT: 213 LBS | HEIGHT: 61 IN | TEMPERATURE: 99.1 F | HEART RATE: 81 BPM

## 2018-03-19 DIAGNOSIS — E66.01 OBESITY, MORBID, BMI 40.0-49.9 (H): ICD-10-CM

## 2018-03-19 DIAGNOSIS — L71.0 PERIORAL DERMATITIS: ICD-10-CM

## 2018-03-19 DIAGNOSIS — E03.9 ACQUIRED HYPOTHYROIDISM: ICD-10-CM

## 2018-03-19 DIAGNOSIS — E78.5 HYPERLIPIDEMIA LDL GOAL <130: ICD-10-CM

## 2018-03-19 DIAGNOSIS — Z12.11 SPECIAL SCREENING FOR MALIGNANT NEOPLASMS, COLON: ICD-10-CM

## 2018-03-19 DIAGNOSIS — R73.9 HYPERGLYCEMIA: Primary | ICD-10-CM

## 2018-03-19 LAB
ALBUMIN SERPL-MCNC: 3.9 G/DL (ref 3.4–5)
ALP SERPL-CCNC: 84 U/L (ref 40–150)
ALT SERPL W P-5'-P-CCNC: 41 U/L (ref 0–50)
ANION GAP SERPL CALCULATED.3IONS-SCNC: 9 MMOL/L (ref 3–14)
AST SERPL W P-5'-P-CCNC: 27 U/L (ref 0–45)
BILIRUB SERPL-MCNC: 0.4 MG/DL (ref 0.2–1.3)
BUN SERPL-MCNC: 14 MG/DL (ref 7–30)
CALCIUM SERPL-MCNC: 9.2 MG/DL (ref 8.5–10.1)
CHLORIDE SERPL-SCNC: 108 MMOL/L (ref 94–109)
CO2 SERPL-SCNC: 24 MMOL/L (ref 20–32)
CREAT SERPL-MCNC: 0.63 MG/DL (ref 0.52–1.04)
GFR SERPL CREATININE-BSD FRML MDRD: >90 ML/MIN/1.7M2
GLUCOSE SERPL-MCNC: 103 MG/DL (ref 70–99)
HBA1C MFR BLD: 5.6 % (ref 4.3–6)
POTASSIUM SERPL-SCNC: 4.4 MMOL/L (ref 3.4–5.3)
PROT SERPL-MCNC: 7.6 G/DL (ref 6.8–8.8)
SODIUM SERPL-SCNC: 141 MMOL/L (ref 133–144)
TSH SERPL DL<=0.005 MIU/L-ACNC: 1.56 MU/L (ref 0.4–4)

## 2018-03-19 PROCEDURE — 84443 ASSAY THYROID STIM HORMONE: CPT | Performed by: FAMILY MEDICINE

## 2018-03-19 PROCEDURE — 99214 OFFICE O/P EST MOD 30 MIN: CPT | Performed by: FAMILY MEDICINE

## 2018-03-19 PROCEDURE — 36415 COLL VENOUS BLD VENIPUNCTURE: CPT | Performed by: FAMILY MEDICINE

## 2018-03-19 PROCEDURE — 83036 HEMOGLOBIN GLYCOSYLATED A1C: CPT | Performed by: FAMILY MEDICINE

## 2018-03-19 PROCEDURE — 80053 COMPREHEN METABOLIC PANEL: CPT | Performed by: FAMILY MEDICINE

## 2018-03-19 RX ORDER — METRONIDAZOLE 7.5 MG/G
GEL TOPICAL 2 TIMES DAILY
Qty: 45 G | Refills: 0 | Status: SHIPPED | OUTPATIENT
Start: 2018-03-19 | End: 2018-06-12

## 2018-03-19 NOTE — LETTER
March 20, 2018      Aziza Adan  [address] [place] [address]        Ms. Adan       Your recent labs looked good.     Complete Metabolic Panel (panel that checks liver function, kidney function and electrolytes) was normal.   Thyroid function test was normal. Continue current dose of Levothyroxine.   Diabetes test was normal, as we discussed in the office.       Resulted Orders   Hemoglobin A1c   Result Value Ref Range    Hemoglobin A1C 5.6 4.3 - 6.0 %   Comprehensive metabolic panel   Result Value Ref Range    Sodium 141 133 - 144 mmol/L    Potassium 4.4 3.4 - 5.3 mmol/L    Chloride 108 94 - 109 mmol/L    Carbon Dioxide 24 20 - 32 mmol/L    Anion Gap 9 3 - 14 mmol/L    Glucose 103 (H) 70 - 99 mg/dL    Urea Nitrogen 14 7 - 30 mg/dL    Creatinine 0.63 0.52 - 1.04 mg/dL    GFR Estimate >90 >60 mL/min/1.7m2      Comment:      Non  GFR Calc    GFR Estimate If Black >90 >60 mL/min/1.7m2      Comment:       GFR Calc    Calcium 9.2 8.5 - 10.1 mg/dL    Bilirubin Total 0.4 0.2 - 1.3 mg/dL    Albumin 3.9 3.4 - 5.0 g/dL    Protein Total 7.6 6.8 - 8.8 g/dL    Alkaline Phosphatase 84 40 - 150 U/L    ALT 41 0 - 50 U/L    AST 27 0 - 45 U/L   TSH with free T4 reflex   Result Value Ref Range    TSH 1.56 0.40 - 4.00 mU/L       If you have any questions or concerns, please call the clinic at the number listed above.       Sincerely,    Alicia Cárdenas MD/christy

## 2018-03-19 NOTE — PATIENT INSTRUCTIONS
Schedule a fasting lab appointment for Lipid panel.   Bring in stool sample for the colon cancer screening.

## 2018-03-19 NOTE — PROGRESS NOTES
SUBJECTIVE:   Aziza Adan is a 57 year old female who presents to clinic today for the following health issues:      Hypothyroidism Follow-up      Since last visit, patient describes the following symptoms: Weight stable, no hair loss, no skin changes, no constipation, no loose stools    Amount of exercise or physical activity: 4-5 days/week for an average of 15-30 minutes    Problems taking medications regularly: No    Medication side effects: none. Currently on levothyroxine 175 mcg daily, due for follow up labs.     Diet: regular (no restrictions)    Patient stated that in [facility] she became very ill and her blood sugar was 345.   Reports feeling much better now.   No prior known history of Diabetes.     Rash      Duration: 4 weeks     Description  Location: around mouth   Itching: mild    Intensity:  moderate    Accompanying signs and symptoms: burning     History (similar episodes/previous evaluation): None    Precipitating or alleviating factors:  New exposures:  Patient had a laser facial peel done in [place] 4 weeks ago.   Recent travel: YES     Therapies tried and outcome: hydrocortisone cream -  not effective and aloe       Problem list and histories reviewed & adjusted, as indicated.  Additional history: as documented    Patient Active Problem List   Diagnosis     Adjustment disorder with depressed mood     Hypothyroidism, unspecified hypothyroidism type     Obesity with body mass index of 30.0-39.9     History of vitamin D deficiency     Hyperlipidemia LDL goal <130     Advanced directives, counseling/discussion     Morbid obesity (H)     Past Surgical History:   Procedure Laterality Date     NO HISTORY OF SURGERY         Social History   Substance Use Topics     Smoking status: Never Smoker     Smokeless tobacco: Never Used     Alcohol use No     Family History   Problem Relation Age of Onset     DIABETES Father      HEART DISEASE Brother      ???open heart surgery at age 40     Depression  "Daughter      committed suicide age     Breast Cancer No family hx of      Colorectal Cancer No family hx of          Current Outpatient Prescriptions   Medication Sig Dispense Refill     metroNIDAZOLE (METROGEL) 0.75 % topical gel Apply topically 2 times daily 45 g 0     pravastatin (PRAVACHOL) 40 MG tablet Take 1 tablet (40 mg) by mouth daily 90 tablet 3     levothyroxine (SYNTHROID/LEVOTHROID) 175 MCG tablet Take 1 tablet (175 mcg) by mouth daily 90 tablet 3     naproxen (NAPROSYN) 500 MG tablet Take 1 tablet (500 mg) by mouth 2 times daily as needed for moderate pain Take with meals (Patient not taking: Reported on 4/13/2017) 30 tablet 1     Allergies   Allergen Reactions     Niacin And Related      \"Shaking all over and turned red\".       Reviewed and updated as needed this visit by clinical staff       Reviewed and updated as needed this visit by Provider         ROS:  Constitutional, HEENT, cardiovascular, pulmonary, gi and gu systems are negative, except as otherwise noted.    OBJECTIVE:     /69  Pulse 81  Temp 99.1  F (37.3  C) (Tympanic)  Ht 5' 1\" (1.549 m)  Wt 213 lb (96.6 kg)  BMI 40.25 kg/m2  Body mass index is 40.25 kg/(m^2).  GENERAL: healthy, alert and no distress  RESP: lungs clear to auscultation - no rales, rhonchi or wheezes  CV: regular rate and rhythm, normal S1 S2, no S3 or S4, no murmur, click or rub, no peripheral edema and peripheral pulses strong  SKIN: Hyperpigmentation noted around the lips.     Diagnostic Test Results:  Reviewed and discussed with patient prior to discharge.  Results for orders placed or performed in visit on 03/19/18   Hemoglobin A1c   Result Value Ref Range    Hemoglobin A1C 5.6 4.3 - 6.0 %         ASSESSMENT/PLAN:     Aziza was seen today for thyroid problem.    Diagnoses and all orders for this visit:    Hyperglycemia, resolved  -     Hemoglobin A1c    Acquired hypothyroidism, on Levothyroxine  -     TSH with free T4 reflex  Will refill Rx after labs are " back.     Perioral dermatitis  -   Recommended to avoid using topical corticosteroids or skin irritants   - Start: metroNIDAZOLE (METROGEL) 0.75 % topical gel; Apply topically 2 times daily    Hyperlipidemia LDL goal <130  -     Comprehensive metabolic panel    Special screening for malignant neoplasms, colon  -     Fecal colorectal cancer screen FIT; Future    Obesity, morbid, BMI 40.0-49.9 (H)  Weight management plan: Discussed healthy diet and exercise guidelines and patient will follow up in 3 months in clinic to re-evaluate.        Due for a Physical in a month, schedule a fasting lipid panel prior to the office visit.       Alicia Cárdenas MD  [facility]

## 2018-03-19 NOTE — NURSING NOTE
"Chief Complaint   Patient presents with     Thyroid Problem       Initial /69  Pulse 81  Temp 99.1  F (37.3  C) (Tympanic)  Ht 5' 1\" (1.549 m)  Wt 213 lb (96.6 kg)  BMI 40.25 kg/m2 Estimated body mass index is 40.25 kg/(m^2) as calculated from the following:    Height as of this encounter: 5' 1\" (1.549 m).    Weight as of this encounter: 213 lb (96.6 kg).  Medication Reconciliation: complete       Ashley Montero CMA      "

## 2018-03-19 NOTE — MR AVS SNAPSHOT
"              After Visit Summary   3/19/2018    Aziza Adan    MRN: [id number]           Patient Information     Date Of Birth          1960        Visit Information        Provider Department      3/19/2018 1:30 PM Alicia Cárdenas MD [facility]        Today's Diagnoses     Hyperglycemia    -  1    Acquired hypothyroidism        Perioral dermatitis        Hyperlipidemia LDL goal <130        Special screening for malignant neoplasms, colon          Care Instructions    Schedule a fasting           Follow-ups after your visit        Follow-up notes from your care team     Return in about 1 month (around 4/19/2018) for Follow up for a Physical.      Future tests that were ordered for you today     Open Future Orders        Priority Expected Expires Ordered    Fecal colorectal cancer screen FIT Routine 4/9/2018 6/11/2018 3/19/2018            Who to contact     Normal or non-critical lab and imaging results will be communicated to you by Qomutyhart, letter or phone within 4 business days after the clinic has received the results. If you do not hear from us within 7 days, please contact the clinic through Qomutyhart or phone. If you have a critical or abnormal lab result, we will notify you by phone as soon as possible.  Submit refill requests through [employer] or call your pharmacy and they will forward the refill request to us. Please allow 3 business days for your refill to be completed.          If you need to speak with a  for additional information , please call: [phone]             Additional Information About Your Visit        QomutyharWiddle Information     [employer] lets you send messages to your doctor, view your test results, renew your prescriptions, schedule appointments and more. To sign up, go to [email or web address] . Click on \"Log in\" on the left side of the screen, which will take you to the Welcome page. Then click on \"Sign up Now\" on the right side of the page. " "    You will be asked to enter the access code listed below, as well as some personal information. Please follow the directions to create your username and password.     Your access code is: [email or web address]  Expires: 2018  1:53 PM     Your access code will  in 90 days. If you need help or a new code, please call your [place] clinic or [phone].        Care EveryWhere ID     This is your Care EveryWhere ID. This could be used by other organizations to access your [place] medical records  [id number]        Your Vitals Were     Pulse Temperature Height BMI (Body Mass Index)          81 99.1  F (37.3  C) (Tympanic) 5' 1\" (1.549 m) 40.25 kg/m2         Blood Pressure from Last 3 Encounters:   18 128/69   17 121/76   17 125/63    Weight from Last 3 Encounters:   18 213 lb (96.6 kg)   17 202 lb 9.6 oz (91.9 kg)   17 201 lb 3.2 oz (91.3 kg)              We Performed the Following     Comprehensive metabolic panel     Hemoglobin A1c     TSH with free T4 reflex          Today's Medication Changes          These changes are accurate as of 3/19/18  1:53 PM.  If you have any questions, ask your nurse or doctor.               Start taking these medicines.        Dose/Directions    metroNIDAZOLE 0.75 % topical gel   Commonly known as:  METROGEL   Used for:  Perioral dermatitis   Started by:  Alicia Cárdenas MD        Apply topically 2 times daily   Quantity:  45 g   Refills:  0            Where to get your medicines      These medications were sent to [id number] [place] - [address] AT [facility] [address]   [address] [place] [address]     Phone:  [phone]     metroNIDAZOLE 0.75 % topical gel                Primary Care Provider Office Phone # Fax #    Alicia Cárdenas [phone] [address] [place] [address]        Equal Access to Services     ROSEMARY LAZO AH: Prakash Ch, " hu coyle, qaybta kaabril cabral, ann omiin hayaan giovanniclau yijeana laAvtarronen ah. So [facility] [phone].    ATENCIÓN: Si jackson cummins, tiene a perry disposición servicios gratuitos de asistencia lingüística. Char al [phone].    We comply with applicable federal civil rights laws and [place] laws. We do not discriminate on the basis of race, color, national origin, age, disability, sex, sexual orientation, or gender identity.            Thank you!     Thank you for choosing [facility]  for your care. Our goal is always to provide you with excellent care. Hearing back from our patients is one way we can continue to improve our services. Please take a few minutes to complete the written survey that you may receive in the mail after your visit with us. Thank you!             Your Updated Medication List - Protect others around you: Learn how to safely use, store and throw away your medicines at www.disposemymeds.org.          This list is accurate as of 3/19/18  1:53 PM.  Always use your most recent med list.                   Brand Name Dispense Instructions for use Diagnosis    levothyroxine 175 MCG tablet    SYNTHROID/LEVOTHROID    90 tablet    Take 1 tablet (175 mcg) by mouth daily    Acquired hypothyroidism       metroNIDAZOLE 0.75 % topical gel    METROGEL    45 g    Apply topically 2 times daily    Perioral dermatitis       naproxen 500 MG tablet    NAPROSYN    30 tablet    Take 1 tablet (500 mg) by mouth 2 times daily as needed for moderate pain Take with meals    Episodic tension-type headache, not intractable       pravastatin 40 MG tablet    PRAVACHOL    90 tablet    Take 1 tablet (40 mg) by mouth daily    Hyperlipidemia with target LDL less than 160

## 2018-03-28 ENCOUNTER — TELEPHONE (OUTPATIENT)
Dept: FAMILY MEDICINE | Facility: CLINIC | Age: 58
End: 2018-03-28

## 2018-03-28 DIAGNOSIS — E03.9 ACQUIRED HYPOTHYROIDISM: ICD-10-CM

## 2018-03-28 NOTE — TELEPHONE ENCOUNTER
pt stopped in thought appt today... concerned that RX cream for face is not working  please advice and call  DR GALLARDO  THANKS

## 2018-03-29 NOTE — TELEPHONE ENCOUNTER
Spoke with pt and rescheduled her appt for 4/2 with Dr. Cárdenas.  She would like her thyroid medication refilled before office visit, Medication and pharmacy pended.   TSH   Date Value Ref Range Status   03/19/2018 1.56 0.40 - 4.00 mU/L Final   ]

## 2018-03-30 RX ORDER — LEVOTHYROXINE SODIUM 175 UG/1
175 TABLET ORAL DAILY
Qty: 90 TABLET | Refills: 3 | Status: SHIPPED | OUTPATIENT
Start: 2018-03-30 | End: 2018-06-12

## 2018-04-02 ENCOUNTER — OFFICE VISIT (OUTPATIENT)
Dept: FAMILY MEDICINE | Facility: CLINIC | Age: 58
End: 2018-04-02
Payer: COMMERCIAL

## 2018-04-02 VITALS
BODY MASS INDEX: 39.87 KG/M2 | DIASTOLIC BLOOD PRESSURE: 52 MMHG | HEART RATE: 70 BPM | OXYGEN SATURATION: 95 % | SYSTOLIC BLOOD PRESSURE: 99 MMHG | TEMPERATURE: 98.3 F | WEIGHT: 211 LBS

## 2018-04-02 DIAGNOSIS — L71.0 PERIORAL DERMATITIS: ICD-10-CM

## 2018-04-02 DIAGNOSIS — E03.9 ACQUIRED HYPOTHYROIDISM: ICD-10-CM

## 2018-04-02 DIAGNOSIS — Z00.00 ROUTINE GENERAL MEDICAL EXAMINATION AT A HEALTH CARE FACILITY: Primary | ICD-10-CM

## 2018-04-02 DIAGNOSIS — F43.23 SITUATIONAL MIXED ANXIETY AND DEPRESSIVE DISORDER: ICD-10-CM

## 2018-04-02 DIAGNOSIS — E78.5 HYPERLIPIDEMIA WITH TARGET LDL LESS THAN 160: ICD-10-CM

## 2018-04-02 PROCEDURE — 99396 PREV VISIT EST AGE 40-64: CPT | Performed by: FAMILY MEDICINE

## 2018-04-02 PROCEDURE — 99213 OFFICE O/P EST LOW 20 MIN: CPT | Mod: 25 | Performed by: FAMILY MEDICINE

## 2018-04-02 RX ORDER — PRAVASTATIN SODIUM 40 MG
40 TABLET ORAL DAILY
Qty: 90 TABLET | Refills: 3 | Status: SHIPPED | OUTPATIENT
Start: 2018-04-02 | End: 2018-06-12

## 2018-04-02 RX ORDER — LEVOTHYROXINE SODIUM 175 UG/1
175 TABLET ORAL DAILY
Qty: 90 TABLET | Refills: 3 | Status: CANCELLED | OUTPATIENT
Start: 2018-04-02

## 2018-04-02 ASSESSMENT — ANXIETY QUESTIONNAIRES
6. BECOMING EASILY ANNOYED OR IRRITABLE: NOT AT ALL
IF YOU CHECKED OFF ANY PROBLEMS ON THIS QUESTIONNAIRE, HOW DIFFICULT HAVE THESE PROBLEMS MADE IT FOR YOU TO DO YOUR WORK, TAKE CARE OF THINGS AT HOME, OR GET ALONG WITH OTHER PEOPLE: SOMEWHAT DIFFICULT
5. BEING SO RESTLESS THAT IT IS HARD TO SIT STILL: NOT AT ALL
3. WORRYING TOO MUCH ABOUT DIFFERENT THINGS: SEVERAL DAYS
2. NOT BEING ABLE TO STOP OR CONTROL WORRYING: SEVERAL DAYS
GAD7 TOTAL SCORE: 6
1. FEELING NERVOUS, ANXIOUS, OR ON EDGE: NOT AT ALL
7. FEELING AFRAID AS IF SOMETHING AWFUL MIGHT HAPPEN: NEARLY EVERY DAY

## 2018-04-02 ASSESSMENT — PATIENT HEALTH QUESTIONNAIRE - PHQ9: 5. POOR APPETITE OR OVEREATING: SEVERAL DAYS

## 2018-04-02 NOTE — NURSING NOTE
"Chief Complaint   Patient presents with     Physical       Initial There were no vitals taken for this visit. Estimated body mass index is 40.25 kg/(m^2) as calculated from the following:    Height as of 3/19/18: 5' 1\" (1.549 m).    Weight as of 3/19/18: 213 lb (96.6 kg).  Medication Reconciliation: complete         FIT test completed -left sample at home    "

## 2018-04-02 NOTE — PROGRESS NOTES
SUBJECTIVE:   CC: Aziza Adan is an 57 year old woman who presents for preventive health visit.     Chief Complaint   Patient presents with     Physical     Depression       Healthy Habits:    Do you get at least three servings of calcium containing foods daily (dairy, green leafy vegetables, etc.)? yes    Amount of exercise or daily activities, outside of work: 2-3 day(s) per week    Problems taking medications regularly No    Medication side effects: No    Have you had an eye exam in the past two years? no    Do you see a dentist twice per year? no    Do you have sleep apnea, excessive snoring or daytime drowsiness?no      Patient feels like the Metronidazole is helping but is not fast enough. Started taking it less than 2 weeks ago. See prior office note for details.     Also afraid that she will lose her son. States that her son is addicted to narcotics. Has  reporting the son's medical provider to the [employer]. Son does not want to go into treatment.   Reports that this is causing a lot of stress and anxiety in the home.     PHQ-9 ([employer]) 4/2/2018   1.  Little interest or pleasure in doing things 2   2.  Feeling down, depressed, or hopeless 2   3.  Trouble falling or staying asleep, or sleeping too much 3   4.  Feeling tired or having little energy 2   5.  Poor appetite or overeating 0   6.  Feeling bad about yourself 1   7.  Trouble concentrating 1   8.  Moving slowly or restless 0   9.  Suicidal or self-harm thoughts 0   PHQ-9 Total Score 11   Difficulty at work, home, or with people Not difficult at all     DK-7   [employer], 2002; Used with Permission) 4/2/2018   1. Feeling nervous, anxious, or on edge 0   2. Not being able to stop or control worrying 1   3. Worrying too much about different things 1   4. Trouble relaxing 1   5. Being so restless that it is hard to sit still 0   6. Becoming easily annoyed or irritable 0   7. Feeling afraid, as if something awful might happen 3   DK-7 Total Score 6    If you checked any problems, how difficult have they made it for you to do your work, take care of things at home, or get along with other people? Somewhat difficult       Today's PHQ-2 Score:   PHQ-2 ( 1999 [employer]) 4/12/2017 1/29/2016   Q1: Little interest or pleasure in doing things 1 3   Q2: Feeling down, depressed or hopeless 1 3   PHQ-2 Score 2 6       Abuse: Current or Past(Physical, Sexual or Emotional)- No  Do you feel safe in your environment - Yes    Social History   Substance Use Topics     Smoking status: Never Smoker     Smokeless tobacco: Never Used     Alcohol use No     If you drink alcohol do you typically have >3 drinks per day or >7 drinks per week? No                     Reviewed orders with patient.  Reviewed health maintenance and updated orders accordingly - Yes  BP Readings from Last 3 Encounters:   04/02/18 99/52   03/19/18 128/69   04/13/17 121/76    Wt Readings from Last 3 Encounters:   04/02/18 211 lb (95.7 kg)   03/19/18 213 lb (96.6 kg)   05/04/17 202 lb 9.6 oz (91.9 kg)                  Patient Active Problem List   Diagnosis     Adjustment disorder with depressed mood     Hypothyroidism, unspecified hypothyroidism type     Obesity with body mass index of 30.0-39.9     History of vitamin D deficiency     Hyperlipidemia LDL goal <130     Advanced directives, counseling/discussion     Morbid obesity (H)     Past Surgical History:   Procedure Laterality Date     NO HISTORY OF SURGERY         Social History   Substance Use Topics     Smoking status: Never Smoker     Smokeless tobacco: Never Used     Alcohol use No     Family History   Problem Relation Age of Onset     DIABETES Father      HEART DISEASE Brother      ???open heart surgery at age 40     Depression Daughter      committed suicide age     Breast Cancer No family hx of      Colorectal Cancer No family hx of          Current Outpatient Prescriptions   Medication Sig Dispense Refill     pravastatin (PRAVACHOL) 40 MG tablet Take  "1 tablet (40 mg) by mouth daily 90 tablet 3     levothyroxine (SYNTHROID/LEVOTHROID) 175 MCG tablet Take 1 tablet (175 mcg) by mouth daily 90 tablet 3     metroNIDAZOLE (METROGEL) 0.75 % topical gel Apply topically 2 times daily (Patient not taking: Reported on 4/2/2018) 45 g 0     [DISCONTINUED] pravastatin (PRAVACHOL) 40 MG tablet Take 1 tablet (40 mg) by mouth daily 90 tablet 3     naproxen (NAPROSYN) 500 MG tablet Take 1 tablet (500 mg) by mouth 2 times daily as needed for moderate pain Take with meals (Patient not taking: Reported on 4/13/2017) 30 tablet 1     Allergies   Allergen Reactions     Niacin And Related      \"Shaking all over and turned red\".       Patient over age 50, mutual decision to screen reflected in health maintenance.    Pertinent mammograms are reviewed under the imaging tab.  History of abnormal Pap smear: NO - age 30- 65 PAP every 3 years recommended    Reviewed and updated as needed this visit by clinical staff  Allergies  Meds         Reviewed and updated as needed this visit by Provider            ROS:  C: NEGATIVE for fever, chills, change in weight  I: NEGATIVE for worrisome rashes, moles or lesions  E: NEGATIVE for vision changes or irritation  ENT: NEGATIVE for ear, mouth and throat problems  R: NEGATIVE for significant cough or SOB  B: NEGATIVE for masses, tenderness or discharge  CV: NEGATIVE for chest pain, palpitations or peripheral edema  GI: NEGATIVE for nausea, abdominal pain, heartburn, or change in bowel habits  : NEGATIVE for unusual urinary or vaginal symptoms. Periods are regular.  M: NEGATIVE for significant arthralgias or myalgia  N: NEGATIVE for weakness, dizziness or paresthesias  P: NEGATIVE for changes in mood or affect    OBJECTIVE:   BP 99/52  Pulse 70  Temp 98.3  F (36.8  C) (Tympanic)  Wt 211 lb (95.7 kg)  SpO2 95%  BMI 39.87 kg/m2  EXAM:  GENERAL: healthy, alert and no distress  EYES: Eyes grossly normal to inspection, PERRL and conjunctivae and " sclerae normal  HENT: ear canals and TM's normal, nose and mouth without ulcers or lesions  NECK: no adenopathy, no asymmetry, masses, or scars and thyroid normal to palpation  RESP: lungs clear to auscultation - no rales, rhonchi or wheezes  BREAST: normal without masses, tenderness or nipple discharge and no palpable axillary masses or adenopathy  CV: regular rate and rhythm, normal S1 S2, no S3 or S4, no murmur, click or rub, no peripheral edema and peripheral pulses strong  ABDOMEN: soft, nontender, no hepatosplenomegaly, no masses and bowel sounds normal  MS: no gross musculoskeletal defects noted, no edema  SKIN: Hyperpigmentation noted around the lips and eyes.   NEURO: Normal strength and tone, mentation intact and speech normal  PSYCH: mentation appears normal, affect normal/bright    DATA  Reviewed and discussed with patient prior to discharge.  Results for orders placed or performed in visit on 03/19/18   Hemoglobin A1c   Result Value Ref Range    Hemoglobin A1C 5.6 4.3 - 6.0 %   Comprehensive metabolic panel   Result Value Ref Range    Sodium 141 133 - 144 mmol/L    Potassium 4.4 3.4 - 5.3 mmol/L    Chloride 108 94 - 109 mmol/L    Carbon Dioxide 24 20 - 32 mmol/L    Anion Gap 9 3 - 14 mmol/L    Glucose 103 (H) 70 - 99 mg/dL    Urea Nitrogen 14 7 - 30 mg/dL    Creatinine 0.63 0.52 - 1.04 mg/dL    GFR Estimate >90 >60 mL/min/1.7m2    GFR Estimate If Black >90 >60 mL/min/1.7m2    Calcium 9.2 8.5 - 10.1 mg/dL    Bilirubin Total 0.4 0.2 - 1.3 mg/dL    Albumin 3.9 3.4 - 5.0 g/dL    Protein Total 7.6 6.8 - 8.8 g/dL    Alkaline Phosphatase 84 40 - 150 U/L    ALT 41 0 - 50 U/L    AST 27 0 - 45 U/L   TSH with free T4 reflex   Result Value Ref Range    TSH 1.56 0.40 - 4.00 mU/L       ASSESSMENT/PLAN:   Aziza was seen today for physical and depression.    Diagnoses and all orders for this visit:    Routine general medical examination at a health care facility    Acquired hypothyroidism  -     Continue: levothyroxine  "(SYNTHROID/LEVOTHROID) 175 MCG tablet; Take 1 tablet (175 mcg) by mouth daily    Hyperlipidemia with target LDL less than 160  -     Refillpravastatin (PRAVACHOL) 40 MG tablet; Take 1 tablet (40 mg) by mouth daily    Situational mixed anxiety and depressive disorder  PHQ-9/DK 7 completed, see above for details.   Offered Mental health referral for counseling. Patient declined at this time.   Follow up to reassess in a month    Perioral dermatitis  -     DERMATOLOGY REFERRAL          COUNSELING:   Reviewed preventive health counseling, as reflected in patient instructions       Regular exercise       Healthy diet/nutrition         reports that she has never smoked. She has never used smokeless tobacco.    Estimated body mass index is 39.87 kg/(m^2) as calculated from the following:    Height as of 3/19/18: 5' 1\" (1.549 m).    Weight as of this encounter: 211 lb (95.7 kg).   Weight management plan: Discussed healthy diet and exercise guidelines and patient will follow up in 12 months in clinic to re-evaluate.    Counseling Resources:  ATP IV Guidelines  Pooled Cohorts Equation Calculator  Breast Cancer Risk Calculator  FRAX Risk Assessment  ICSI Preventive Guidelines  Dietary Guidelines for Americans, 2010  USDA's MyPlate  ASA Prophylaxis  Lung CA Screening    Follow up annually and as needed thoughout the year.    Alicia Cárdenas MD  [facility] [place]  "

## 2018-04-02 NOTE — MR AVS SNAPSHOT
After Visit Summary   4/2/2018    Aziza Adan    MRN: [id number]           Patient Information     Date Of Birth          1960        Visit Information        Provider Department      4/2/2018 1:30 PM Alicia Cárdenas MD [facility]        Today's Diagnoses     Routine general medical examination at a health care facility    -  1    Acquired hypothyroidism        Hyperlipidemia with target LDL less than 160        Situational mixed anxiety and depressive disorder        Perioral dermatitis          Care Instructions      Preventive Health Recommendations  Female Ages 50 - 64    Yearly exam: See your health care provider every year in order to  o Review health changes.   o Discuss preventive care.    o Review your medicines if your doctor has prescribed any.      Get a Pap test every three years (unless you have an abnormal result and your provider advises testing more often).    If you get Pap tests with HPV test, you only need to test every 5 years, unless you have an abnormal result.     You do not need a Pap test if your uterus was removed (hysterectomy) and you have not had cancer.    You should be tested each year for STDs (sexually transmitted diseases) if you're at risk.     Have a mammogram every 1 to 2 years.    Have a colonoscopy at age 50, or have a yearly FIT test (stool test). These exams screen for colon cancer.      Have a cholesterol test every 5 years, or more often if advised.    Have a diabetes test (fasting glucose) every three years. If you are at risk for diabetes, you should have this test more often.     If you are at risk for osteoporosis (brittle bone disease), think about having a bone density scan (DEXA).    Shots: Get a flu shot each year. Get a tetanus shot every 10 years.    Nutrition:     Eat at least 5 servings of fruits and vegetables each day.    Eat whole-grain bread, whole-wheat pasta and brown rice instead of white grains and  rice.    Talk to your provider about Calcium and Vitamin D.     Lifestyle    Exercise at least 150 minutes a week (30 minutes a day, 5 days a week). This will help you control your weight and prevent disease.    Limit alcohol to one drink per day.    No smoking.     Wear sunscreen to prevent skin cancer.     See your dentist every six months for an exam and cleaning.    See your eye doctor every 1 to 2 years.            Follow-ups after your visit        Additional Services     DERMATOLOGY REFERRAL       Your provider has referred you to: [place]: [facility] [phone]   [email or web address]    Please be aware that coverage of these services is subject to the terms and limitations of your health insurance plan.  Call member services at your health plan with any benefit or coverage questions.      Please bring the following with you to your appointment:    (1) Any X-Rays, CTs or MRIs which have been performed.  Contact the facility where they were done to arrange for  prior to your scheduled appointment.    (2) List of current medications  (3) This referral request   (4) Any documents/labs given to you for this referral                  Follow-up notes from your care team     Return in about 1 month (around 5/2/2018) for Physical Exam and as needed throughout the year, Follow up.      Who to contact     Normal or non-critical lab and imaging results will be communicated to you by MyChart, letter or phone within 4 business days after the clinic has received the results. If you do not hear from us within 7 days, please contact the clinic through MyChart or phone. If you have a critical or abnormal lab result, we will notify you by phone as soon as possible.  Submit refill requests through [employer] or call your pharmacy and they will forward the refill request to us. Please allow 3 business days for your refill to be completed.          If you need to speak with a  " for additional information , please call: [phone]             Additional Information About Your Visit        [employer] Information     [employer] lets you send messages to your doctor, view your test results, renew your prescriptions, schedule appointments and more. To sign up, go to [email or web address] . Click on \"Log in\" on the left side of the screen, which will take you to the Welcome page. Then click on \"Sign up Now\" on the right side of the page.     You will be asked to enter the access code listed below, as well as some personal information. Please follow the directions to create your username and password.     Your access code is: [email or web address]  Expires: 2018  1:53 PM     Your access code will  in 90 days. If you need help or a new code, please call your [facility] clinic or [phone].        Care EveryWhere ID     This is your Care EveryWhere ID. This could be used by other organizations to access your [facility] medical records  [id number]         Blood Pressure from Last 3 Encounters:   18 128/69   17 121/76   17 125/63    Weight from Last 3 Encounters:   18 213 lb (96.6 kg)   17 202 lb 9.6 oz (91.9 kg)   17 201 lb 3.2 oz (91.3 kg)              We Performed the Following     DERMATOLOGY REFERRAL          Where to get your medicines      These medications were sent to [facility] [id number]  [place], [place] - [address] AT [facility] [address], [place] [address]     Phone:  [phone]     pravastatin 40 MG tablet          Primary Care Provider Office Phone # Fax #    Alicia Cárdenas [phone]       [address]  [place] [address]        Equal Access to Services     CUCO LAZO AH: Prakash Ch, hu coyle, ann gudinoaan ah. [facility] [phone].    ATENCIÓN: Si lawrence pickett a perry " disposición servicios gratuitos de asistencia lingüística. Char rahman [phone].    We comply with applicable federal civil rights laws and [place] laws. We do not discriminate on the basis of race, color, national origin, age, disability, sex, sexual orientation, or gender identity.            Thank you!     Thank you for choosing [facility]  for your care. Our goal is always to provide you with excellent care. Hearing back from our patients is one way we can continue to improve our services. Please take a few minutes to complete the written survey that you may receive in the mail after your visit with us. Thank you!             Your Updated Medication List - Protect others around you: Learn how to safely use, store and throw away your medicines at www.disposemymeds.org.          This list is accurate as of 4/2/18  2:17 PM.  Always use your most recent med list.                   Brand Name Dispense Instructions for use Diagnosis    levothyroxine 175 MCG tablet    SYNTHROID/LEVOTHROID    90 tablet    Take 1 tablet (175 mcg) by mouth daily    Acquired hypothyroidism       metroNIDAZOLE 0.75 % topical gel    METROGEL    45 g    Apply topically 2 times daily    Perioral dermatitis       naproxen 500 MG tablet    NAPROSYN    30 tablet    Take 1 tablet (500 mg) by mouth 2 times daily as needed for moderate pain Take with meals    Episodic tension-type headache, not intractable       pravastatin 40 MG tablet    PRAVACHOL    90 tablet    Take 1 tablet (40 mg) by mouth daily    Hyperlipidemia with target LDL less than 160

## 2018-04-03 ASSESSMENT — ANXIETY QUESTIONNAIRES: GAD7 TOTAL SCORE: 6

## 2018-04-03 ASSESSMENT — PATIENT HEALTH QUESTIONNAIRE - PHQ9: SUM OF ALL RESPONSES TO PHQ QUESTIONS 1-9: 11

## 2018-06-11 NOTE — PROGRESS NOTES
SUBJECTIVE:   Aziza Adan is a 57 year old female who presents to clinic today for the following health issues:      Hyperlipidemia Follow-Up      Rate your low fat/cholesterol diet?: good    Taking statin?  Yes, no muscle aches from statin    Other lipid medications/supplements?:  none    Hypothyroidism Follow-up      Since last visit, patient describes the following symptoms: Weight stable, no hair loss, no skin changes, no constipation, no loose stools      Amount of exercise or physical activity: 2-3 days/week for an average of 15-30 minutes    Problems taking medications regularly: No    Medication side effects: none    Diet: regular (no restrictions)    Patient states that she is leaving the country and has no expected return date.  She needs a years refills, as the medication in her country does not work for her.       Problem list and histories reviewed & adjusted, as indicated.  Additional history: as documented    Patient Active Problem List   Diagnosis     Adjustment disorder with depressed mood     Hypothyroidism, unspecified hypothyroidism type     Obesity with body mass index of 30.0-39.9     History of vitamin D deficiency     Hyperlipidemia LDL goal <130     Advanced directives, counseling/discussion     Morbid obesity (H)     Past Surgical History:   Procedure Laterality Date     NO HISTORY OF SURGERY         Social History   Substance Use Topics     Smoking status: Never Smoker     Smokeless tobacco: Never Used     Alcohol use No     Family History   Problem Relation Age of Onset     DIABETES Father      HEART DISEASE Brother      ???open heart surgery at age 40     Depression Daughter      committed suicide age     Breast Cancer No family hx of      Colorectal Cancer No family hx of          Current Outpatient Prescriptions   Medication Sig Dispense Refill     levothyroxine (SYNTHROID/LEVOTHROID) 175 MCG tablet Take 1 tablet (175 mcg) by mouth daily 360 tablet 0     metroNIDAZOLE  "(METROGEL) 0.75 % topical gel Apply topically 2 times daily 180 g 0     naproxen (NAPROSYN) 500 MG tablet Take 1 tablet (500 mg) by mouth 2 times daily as needed for moderate pain (take with food) Take with meals 720 tablet 0     pravastatin (PRAVACHOL) 40 MG tablet Take 1 tablet (40 mg) by mouth daily 360 tablet 0     [DISCONTINUED] levothyroxine (SYNTHROID/LEVOTHROID) 175 MCG tablet Take 1 tablet (175 mcg) by mouth daily 90 tablet 3     [DISCONTINUED] pravastatin (PRAVACHOL) 40 MG tablet Take 1 tablet (40 mg) by mouth daily 90 tablet 3     Allergies   Allergen Reactions     Niacin And Related      \"Shaking all over and turned red\".     Recent Labs   Lab Test  03/19/18   1356  03/19/18   1326  04/13/17   1126  03/30/17   0906   01/29/16   1110   A1C   --   5.6  5.4   --    --    --    LDL   --    --    --   157*   --   92   HDL   --    --    --   46*   --   44*   TRIG   --    --    --   140   --   96   ALT  41   --    --    --    --   48   CR  0.63   --    --    --    --   0.72   GFRESTIMATED  >90   --    --    --    --   84   GFRESTBLACK  >90   --    --    --    --   >90   GFR Calc     POTASSIUM  4.4   --    --    --    --   4.1   TSH  1.56   --    --   0.64   < >  0.74    < > = values in this interval not displayed.      BP Readings from Last 3 Encounters:   06/12/18 116/74   04/02/18 99/52   03/19/18 128/69    Wt Readings from Last 3 Encounters:   06/12/18 207 lb (93.9 kg)   04/02/18 211 lb (95.7 kg)   03/19/18 213 lb (96.6 kg)                  Labs reviewed in EPIC    Reviewed and updated as needed this visit by clinical staff  Tobacco  Allergies  Meds  Med Hx  Surg Hx  Fam Hx  Soc Hx      Reviewed and updated as needed this visit by Provider         ROS:  Constitutional, HEENT, cardiovascular, pulmonary, GI, , musculoskeletal, neuro, skin, endocrine and psych systems are negative, except as otherwise noted.    OBJECTIVE:     /74  Pulse 75  Temp 98.1  F (36.7  C) (Oral)  Resp 16 "  Wt 207 lb (93.9 kg)  SpO2 94%  BMI 39.11 kg/m2  Body mass index is 39.11 kg/(m^2).  GENERAL: healthy, alert and no distress  NECK: no adenopathy, no asymmetry, masses, or scars and thyroid normal to palpation  RESP: lungs clear to auscultation - no rales, rhonchi or wheezes  CV: regular rate and rhythm, normal S1 S2, no S3 or S4, no murmur, click or rub, no peripheral edema and peripheral pulses strong  PSYCH: mentation appears normal, affect normal/bright    Diagnostic Test Results:  See orders    ASSESSMENT/PLAN:         ICD-10-CM    1. Hyperlipidemia with target LDL less than 160 E78.5 Lipid panel reflex to direct LDL Non-fasting     pravastatin (PRAVACHOL) 40 MG tablet   2. Acquired hypothyroidism E03.9 levothyroxine (SYNTHROID/LEVOTHROID) 175 MCG tablet   3. Episodic tension-type headache, not intractable G44.219 naproxen (NAPROSYN) 500 MG tablet   4. Perioral dermatitis L71.0 metroNIDAZOLE (METROGEL) 0.75 % topical gel   5. Screening for human immunodeficiency virus Z11.4 HIV Antigen Antibody Combo    once in lifetime   6. Special screening for malignant neoplasms, colon Z12.11 GASTROENTEROLOGY ADULT REF PROCEDURE ONLY     Fecal colorectal cancer screen (FIT)       See Patient Instructions: Safe travels, follow up when you return to the [place].     Santa Martin, P  [facility] [place]

## 2018-06-12 ENCOUNTER — OFFICE VISIT (OUTPATIENT)
Dept: FAMILY MEDICINE | Facility: CLINIC | Age: 58
End: 2018-06-12
Payer: COMMERCIAL

## 2018-06-12 VITALS
HEART RATE: 75 BPM | SYSTOLIC BLOOD PRESSURE: 116 MMHG | BODY MASS INDEX: 39.11 KG/M2 | RESPIRATION RATE: 16 BRPM | TEMPERATURE: 98.1 F | DIASTOLIC BLOOD PRESSURE: 74 MMHG | OXYGEN SATURATION: 94 % | WEIGHT: 207 LBS

## 2018-06-12 DIAGNOSIS — E03.9 ACQUIRED HYPOTHYROIDISM: ICD-10-CM

## 2018-06-12 DIAGNOSIS — Z11.4 SCREENING FOR HUMAN IMMUNODEFICIENCY VIRUS: ICD-10-CM

## 2018-06-12 DIAGNOSIS — L71.0 PERIORAL DERMATITIS: ICD-10-CM

## 2018-06-12 DIAGNOSIS — G44.219 EPISODIC TENSION-TYPE HEADACHE, NOT INTRACTABLE: ICD-10-CM

## 2018-06-12 DIAGNOSIS — E78.5 HYPERLIPIDEMIA WITH TARGET LDL LESS THAN 160: Primary | ICD-10-CM

## 2018-06-12 DIAGNOSIS — Z12.11 SPECIAL SCREENING FOR MALIGNANT NEOPLASMS, COLON: ICD-10-CM

## 2018-06-12 LAB
CHOLEST SERPL-MCNC: 249 MG/DL
HDLC SERPL-MCNC: 39 MG/DL
LDLC SERPL CALC-MCNC: 193 MG/DL
NONHDLC SERPL-MCNC: 210 MG/DL
TRIGL SERPL-MCNC: 85 MG/DL

## 2018-06-12 PROCEDURE — 80061 LIPID PANEL: CPT | Performed by: NURSE PRACTITIONER

## 2018-06-12 PROCEDURE — 99214 OFFICE O/P EST MOD 30 MIN: CPT | Performed by: NURSE PRACTITIONER

## 2018-06-12 PROCEDURE — 87389 HIV-1 AG W/HIV-1&-2 AB AG IA: CPT | Performed by: NURSE PRACTITIONER

## 2018-06-12 PROCEDURE — 36415 COLL VENOUS BLD VENIPUNCTURE: CPT | Performed by: NURSE PRACTITIONER

## 2018-06-12 RX ORDER — LEVOTHYROXINE SODIUM 175 UG/1
175 TABLET ORAL DAILY
Qty: 360 TABLET | Refills: 0 | Status: SHIPPED | OUTPATIENT
Start: 2018-06-12 | End: 2018-06-16

## 2018-06-12 RX ORDER — NAPROXEN 500 MG/1
500 TABLET ORAL 2 TIMES DAILY PRN
Qty: 720 TABLET | Refills: 0 | Status: SHIPPED | OUTPATIENT
Start: 2018-06-12 | End: 2018-06-16

## 2018-06-12 RX ORDER — PRAVASTATIN SODIUM 40 MG
40 TABLET ORAL DAILY
Qty: 360 TABLET | Refills: 0 | Status: SHIPPED | OUTPATIENT
Start: 2018-06-12 | End: 2018-06-16

## 2018-06-12 RX ORDER — METRONIDAZOLE 7.5 MG/G
GEL TOPICAL 2 TIMES DAILY
Qty: 180 G | Refills: 0 | Status: SHIPPED | OUTPATIENT
Start: 2018-06-12 | End: 2018-06-16

## 2018-06-12 NOTE — MR AVS SNAPSHOT
After Visit Summary   6/12/2018    Aziza Adan    MRN: [id number]           Patient Information     Date Of Birth          1960        Visit Information        Provider Department      6/12/2018 3:20 PM Santa Martin NP [facility] [place]        Today's Diagnoses     Screening for human immunodeficiency virus    -  1    Acquired hypothyroidism        Episodic tension-type headache, not intractable        Hyperlipidemia with target LDL less than 160        Perioral dermatitis        Special screening for malignant neoplasms, colon          Care Instructions    Safe travels, follow up when you return to the [place].           Follow-ups after your visit        Additional Services     GASTROENTEROLOGY ADULT REF PROCEDURE ONLY       Last Lab Result: Creatinine (mg/dL)       Date                     Value                 03/19/2018               0.63             ----------  There is no height or weight on file to calculate BMI.     Needed:  No  Language:  Belarusian    Patient will be contacted to schedule procedure.     Please be aware that coverage of these services is subject to the terms and limitations of your health insurance plan.  Call member services at your health plan with any benefit or coverage questions.  Any procedures must be performed at a [place] facility OR coordinated by your clinic's referral office.    Please bring the following with you to your appointment:    (1) Any X-Rays, CTs or MRIs which have been performed.  Contact the facility where they were done to arrange for  prior to your scheduled appointment.    (2) List of current medications   (3) This referral request   (4) Any documents/labs given to you for this referral                  Follow-up notes from your care team     Return if symptoms worsen or fail to improve.      Future tests that were ordered for you today     Open Future Orders        Priority Expected Expires Ordered    Fecal  "colorectal cancer screen (FIT) Routine 2018 9/3/2018 2018            Who to contact     Normal or non-critical lab and imaging results will be communicated to you by [employer], letter or phone within 4 business days after the clinic has received the results. If you do not hear from us within 7 days, please contact the clinic through [employer] or phone. If you have a critical or abnormal lab result, we will notify you by phone as soon as possible.  Submit refill requests through [employer] or call your pharmacy and they will forward the refill request to us. Please allow 3 business days for your refill to be completed.          If you need to speak with a  for additional information , please call: [phone]             Additional Information About Your Visit        SEC Watch Information     [employer] Watch lets you send messages to your doctor, view your test results, renew your prescriptions, schedule appointments and more. To sign up, go to [email or web address] . Click on \"Log in\" on the left side of the screen, which will take you to the Welcome page. Then click on \"Sign up Now\" on the right side of the page.     You will be asked to enter the access code listed below, as well as some personal information. Please follow the directions to create your username and password.     Your access code is: [email or web address]  Expires: 2018  1:53 PM     Your access code will  in 90 days. If you need help or a new code, please call your [facility] clinic or [phone].        Care EveryWhere ID     This is your Care EveryWhere ID. This could be used by other organizations to access your [facility] medical records  [id number]        Your Vitals Were     Pulse Temperature Respirations Pulse Oximetry BMI (Body Mass Index)       75 98.1  F (36.7  C) (Oral) 16 94% 39.11 kg/m2        Blood Pressure from Last 3 Encounters:   18 116/74   18 99/52   18 128/69    Weight from Last 3 Encounters: "   06/12/18 207 lb (93.9 kg)   04/02/18 211 lb (95.7 kg)   03/19/18 213 lb (96.6 kg)              We Performed the Following     DEPRESSION ACTION PLAN (DAP)     GASTROENTEROLOGY ADULT REF PROCEDURE ONLY     HIV Antigen Antibody Combo     Lipid panel reflex to direct LDL Non-fasting          Today's Medication Changes          These changes are accurate as of 6/12/18  3:27 PM.  If you have any questions, ask your nurse or doctor.               These medicines have changed or have updated prescriptions.        Dose/Directions    naproxen 500 MG tablet   Commonly known as:  NAPROSYN   This may have changed:  reasons to take this   Used for:  Episodic tension-type headache, not intractable   Changed by:  Santa Martin NP        Dose:  500 mg   Take 1 tablet (500 mg) by mouth 2 times daily as needed for moderate pain (take with food) Take with meals   Quantity:  720 tablet   Refills:  0            Where to get your medicines      These medications were sent to [facility]  [address] [facility]  [address] [place], [place] [address] [address]     Phone:  [phone]     levothyroxine 175 MCG tablet    metroNIDAZOLE 0.75 % topical gel    naproxen 500 MG tablet    pravastatin 40 MG tablet                Primary Care Provider Office Phone # Fax #    Alicia Cárdenas [phone]       [address]  [place] [address]        Equal Access to Services     [facility] AH: Hadii juanpablo ku hadasho Soshiraali, waaxda luqadaha, qaybta kaalmada misha, ann ly. So [facility] [phone].    ATENCIÓN: Si habla español, tiene a perry disposición servicios gratuitos de asistencia lingüística. Char al [phone].    We comply with applicable federal civil rights laws and [place] laws. We do not discriminate on the basis of race, color, national origin, age, disability, sex, sexual orientation, or gender identity.            Thank you!     Thank you for choosing  [facility]  for your care. Our goal is always to provide you with excellent care. Hearing back from our patients is one way we can continue to improve our services. Please take a few minutes to complete the written survey that you may receive in the mail after your visit with us. Thank you!             Your Updated Medication List - Protect others around you: Learn how to safely use, store and throw away your medicines at www.disposemymeds.org.          This list is accurate as of 6/12/18  3:27 PM.  Always use your most recent med list.                   Brand Name Dispense Instructions for use Diagnosis    levothyroxine 175 MCG tablet    SYNTHROID/LEVOTHROID    360 tablet    Take 1 tablet (175 mcg) by mouth daily    Acquired hypothyroidism       metroNIDAZOLE 0.75 % topical gel    METROGEL    180 g    Apply topically 2 times daily    Perioral dermatitis       naproxen 500 MG tablet    NAPROSYN    720 tablet    Take 1 tablet (500 mg) by mouth 2 times daily as needed for moderate pain (take with food) Take with meals    Episodic tension-type headache, not intractable       pravastatin 40 MG tablet    PRAVACHOL    360 tablet    Take 1 tablet (40 mg) by mouth daily    Hyperlipidemia with target LDL less than 160

## 2018-06-12 NOTE — LETTER
June 19, 2018      Aziza Kuo Loco  [address] [place] [address]        Dear Ms.Waokgavin,    We are writing to inform you of your test results.    Cholesterol is elevated. Continue your prescription medications. Try to incorporate some of the below tips into your daily routine, to help improve your cholesterol. Follow up as needed.     Resulted Orders   HIV Antigen Antibody Combo   Result Value Ref Range    HIV Antigen Antibody Combo Nonreactive NR^Nonreactive          Comment:      HIV-1 p24 Ag & HIV-1/HIV-2 Ab Not Detected   Lipid panel reflex to direct LDL Non-fasting   Result Value Ref Range    Cholesterol 249 (H) <200 mg/dL      Comment:      Desirable:       <200 mg/dl    Triglycerides 85 <150 mg/dL    HDL Cholesterol 39 (L) >49 mg/dL    LDL Cholesterol Calculated 193 (H) <100 mg/dL      Comment:      Above desirable:  100-129 mg/dl  Borderline High:  130-159 mg/dL  High:             160-189 mg/dL  Very high:       >189 mg/dl      Non HDL Cholesterol 210 (H) <130 mg/dL      Comment:      Above Desirable:  130-159 mg/dl  Borderline high:  160-189 mg/dl  High:             190-219 mg/dl  Very high:       >219 mg/dl       Controlling Cholesterol   What is cholesterol?   Cholesterol is a fatty substance. It has both good and bad effects on the body. Your body needs small amounts of cholesterol to make hormones and to build and maintain cells. However, when your body has too much cholesterol, deposits of fat called plaque form inside the walls of your blood vessels (arteries). The blood vessel walls thicken and the vessels become narrower. This is a condition called atherosclerosis. These changes make it harder for blood to flow through the blood vessels, increasing your risk of heart disease, heart attack, and stroke. The plaque can also easily break off and cause a blockage. When the artery is blocked, no blood can flow through it. This prevents the heart muscle from getting oxygen and can cause a heart  "attack. If a piece of plaque breaks off and flows to the brain, it can cause a stroke.   Most of the cholesterol in your blood is made by your liver from the fats, carbohydrates, and proteins you eat. You also get cholesterol by eating animal products such as meat, eggs, and high-fat dairy products such as whole milk, cream, and real butter.   It is important to find out what your cholesterol numbers are because lowering cholesterol levels that are too high lessens your risk for developing heart disease. It reduces the chance of a heart attack or death from heart disease, even if you already have heart disease.   How is cholesterol measured?   When you get your cholesterol checked, your healthcare provider will give you a number for your total cholesterol level. You can use the chart below to see if your total cholesterol is high.     Total Cholesterol Level (mg/dL)   ----------------------------------------   less than 200   good   200 to 239      borderline high   240 or above    high   ----------------------------------------     When your total cholesterol is measured and found to be high, your healthcare provider may also check the amount of LDL (low-density lipoprotein) and HDL (high-density lipoprotein) in your blood. LDL and HDL carry cholesterol through your blood. LDL carries a lot of cholesterol, leaves behind fatty deposits on your artery walls, and contributes to heart disease. HDL does the opposite. HDL cleans the artery walls and removes extra cholesterol from the body, thus lowering the risk of heart disease. LDL cholesterol is called bad cholesterol. (You can think of \"L\" for \"lousy\" cholesterol.) HDL cholesterol is called good cholesterol (think of \"H\" for \"healthy\" cholesterol). It is good to have low levels of LDL and high levels of HDL.   Because HDL cholesterol protects against heart disease, higher numbers are better. HDL levels of 60 mg/dL or more help to lower your risk for heart disease. A " level equal to or less than 40 mg/dL is low and is considered a major risk factor because it increases your risk for developing heart disease   The level of LDL cholesterol that is healthy for you depends on your risk of heart disease and heart attack. In general, the higher your LDL level and the more risk factors you have for heart disease, the greater your chances of developing heart disease or having a heart attack. These are the recommended goals for LDL, according to risk level:   The goal is less than 160 mg/dL if your risk of heart disease is low.   The goal is less than 130 mg/dL if you have a moderate risk.   The goal is less than 100 mg/dL if you have a high risk of heart disease or you already have heart disease or diabetes.   For many people with heart disease, especially if they also have diabetes, the goal is less than 70 mg/dL.   Lowering cholesterol, especially the LDL, is connected or linked with:   Slowing, stopping, or even reversing the buildup of plaque   Reducing the chances of heart attack by making plaques more stable and less likely to break off or rupture.   This means the chance of having a heart attack is much less.   In addition to high levels of total cholesterol and LDL, major risks for heart disease include:   diabetes   cigarette smoking   high blood pressure (140/90 mm Hg or higher or you are taking blood pressure medicine)   low HDL cholesterol (less than 40 mg/dL)   family history of early heart disease (father or brother diagnosed with heart disease before age 55, or mother or sister diagnosed before age 65)   age 45 or older for men and age 55 or older for women.   If you have diabetes, your risk of heart disease is high. If you do not have diabetes but you have 2 or more of the other risk factors in this list, your risk is moderate to high. Based on your personal and family history, your healthcare provider can help you calculate your risk level.   How can I control my  "cholesterol level?   High cholesterol may run in families. Know your family history and discuss it with your healthcare provider.   You can often control cholesterol levels by   eating right   exercising   not smoking   losing weight if you are overweight.   If you have a high risk for heart disease, your healthcare provider may prescribe cholesterol-lowering medicine as well as changes in lifestyle.   Eat right.   Follow these diet guidelines to help control your cholesterol:   Limit the cholesterol in your diet to less than 300 mg per day. If you have heart disease, limit cholesterol to less than 200 mg per day.   Be careful about the amounts and types of fat that you eat. Fats should contribute no more than 25 to 35% of your daily calories. Most of your dietary fat should be from polyunsaturated and monounsaturated fats, which are healthier than saturated fat and trans fats.   Saturated fat raises your blood cholesterol because it makes it hard for the body to clear the cholesterol away. Less than 7 to 10% of your calories should come from saturated fat. Saturated fat is found in different amounts in almost all foods. Butter, some oils, meat, and poultry fat contain a lot of saturated fat.   Trans fatty acids, often called trans fats, tend to raise your bad LDL cholesterol and lower your good HDL cholesterol. Trans fats naturally occur in some foods, mostly in meat and dairy products. But food makers can create trans fats when they are preparing food for grocery stores. This is usually done by adding hydrogen to fats. If the list of ingredients of a food product includes the words \"partially hydrogenated\" (usually referring to oils, such as soybean oil and others), the product is likely to contain trans fats. Try to eat as little trans fat as possible. As of January 2006, nutrition labels must list trans fats if the food contains them. Check the nutrition bar on the side of the package.   Polyunsaturated fats are " found in fish and some vegetable oils. Monounsaturated fats are found in olive oil, canola oil, and avocados. Both types of these healthier fats are also found in many nuts and legumes.   Adjust the amount of calories you eat and exercise regularly, according to your healthcare provider's exercise prescription, to maintain your recommended body weight.   To control the cholesterol and types and amounts of fat you eat:   Check food labels for fat and cholesterol content. Choose the foods with less fat per serving.   Limit the amount of butter and margarine you eat.   Use olive, canola, sunflower, safflower, soybean, or corn oil. Avoid tropical oils such as palm or coconut oil. Also avoid oils that have been hydrogenated or partially hydrogenated.   Use salad dressings and margarine made with polyunsaturated and monounsaturated fats.   Use egg whites or egg substitutes rather than whole eggs.   Replace whole-milk dairy products with nonfat or low-fat milk, cheese, spreads, and yogurt.   Eat skinless chicken, turkey, fish, and meatless entrees more often than red meat.   Choose lean cuts of meat and trim off all visible fat. Keep portion sizes moderate.   Avoid fatty desserts such as ice cream, cream-filled cakes, and cheesecakes. Choose fresh fruits, nonfat frozen yogurt, Popsicles, etc.   Reduce the amount of fried foods, vending machine food, and fast food you eat.   Eat several daily servings of fruits and vegetables (especially fresh fruits and leafy vegetables), beans, and whole grains (such as whole wheat, bran, brown rice, oats, and oatmeal). The fiber in these foods helps lower cholesterol.   Eat 4 to 5 servings of nuts a week. Examples of nuts that can be a part of a healthy diet are walnuts, almonds, hazelnuts, peanuts, pecans, and pistachio nuts.   Look for low-fat or nonfat varieties of the foods you like to eat, or look for substitutes.   Exercise.   Exercise goes hand-in-hand with a healthy diet for  "controlling cholesterol. Exercise helps because it:   Keeps your weight down.   Decreases your total cholesterol level.   Decreases your LDL (bad cholesterol).   Increases your HDL (good cholesterol).   A good exercise program includes aerobic exercise. Aerobic exercise is any activity that keeps your heart rate up (such as swimming, jogging, walking, and bicycling). You should get at least 30 minutes of moderate aerobic exercise most days of the week. Moderate aerobic exercise is generally defined as requiring the energy it takes to walk 2 miles in 30 minutes. You may need to exercise 60 minutes a day to prevent weight gain and 90 minutes a day to lose weight.   If you haven't been exercising, ask your healthcare provider for an exercise prescription and start your new exercise program slowly.   Don't smoke.   Do not smoke. Smoking increases your risk of heart disease because it lowers HDL levels, increases your risk of blood clots, and decreases oxygen to the tissues.   Lose excess weight.   Extra weight increases your risk for heart and blood vessel disease. One way it does this is by causing your LDL (\"bad\") cholesterol to go up. Extra weight can also make you tired. It takes a lot of energy to carry all those pounds around. The result is that you are less active. This can mean that you don't get enough exercise and gain even more weight.   Losing excess weight:   Improves not only the bad LDL cholesterol but other blood fats as well.   Lowers your risk for heart attack or stroke.   Increases your energy and helps you feel better (both physically and mentally) and become more active.   Your weight is primarily the result of 2 factors. One is the number of calories you consume. The other is the number of calories you \"burn\". If you eat more calories than you use, your body will store the extra calories as fat and your weight will go up. If your body uses more calories than you eat, you will lose weight.   Here " are some things you can do to lose weight.   Talk to your healthcare provider about your weight. Ask how a change in diet and exercise will change your cholesterol levels. Plan for gradual weight loss, just 1 or 2 pounds per week   Eat fewer calories.   Get more physical activity.   Keep a diary of your food and exercise for a couple of weeks to become more aware of your habits.   In summary, changes that you can make in your lifestyle to control your cholesterol level are:   Eat healthy.   Get regular exercise.   Don't smoke.   Keep a healthy weight.   Have your cholesterol levels checked as often as your provider recommends.   Cholesterol in the Diet   Cholesterol is a fatty substance in your body and in foods made from animals. There is a lot of it in meat, including beef, pork, chicken, and turkey. Whole-milk dairy products, egg yolks, and shellfish also have a lot of cholesterol.   Your body needs cholesterol to make hormones and build nerve cells. You don't have to get it from food because your body makes cholesterol. If you eat too many foods high in cholesterol or saturated fat you can get too much cholesterol. It can cause high levels of cholesterol in the blood. High cholesterol increases your risk for heart disease.   How are saturated fat and trans fats related to cholesterol levels?   Like cholesterol, saturated fats are found mostly in animal products. Limiting the saturated fat in your diet is just as important as limiting cholesterol because your body makes more cholesterol when you eat saturated fat. Trans fats are another type of fat in animal products. Trans fats are also in many processed foods, such as cakes, cookies and potato chips. Like saturated fat, trans fats raise cholesterol levels in the blood.   How much cholesterol do animal products have?   As the table below shows, some foods have more cholesterol and saturated fat than others. They may be high in both, or low in both, or high in  one but not the other. The healthiest diets include mostly foods that are low in cholesterol, saturated, and trans fat.   Most foods in the meat group have about the same amount of cholesterol per serving, regardless of the type or cut of meat. However, the amount of saturated fat in these various meats can be very different. High-fat cuts, such as prime rib and dark-meat poultry with the skin, contain a lot more saturated fat than lean cuts, such as pork tenderloin and chicken breast without skin.   Whole-milk dairy products, such as whole milk, cheese, ice cream, sour cream, and butter, have a lot of cholesterol and saturated fat. The good news is that food producers can remove both cholesterol and saturated fat from dairy foods. When dairy is skimmed of its fats, the cholesterol is skimmed off along with it. Skim (nonfat) dairy products are a healthy food choice.   Shellfish are low in saturated fat. Some shellfish are high in cholesterol, but the saturated fat is so low that these foods are still healthy. Fin fish, such as salmon, tuna, trout, and halibut, are relatively low in cholesterol and saturated fat.   Cholesterol and Saturated Fat Content of Selected Foods     Food                                 Fat             Cholesterol                                       (grams)         (milligrams)   --------------------------------------------------------------------   8 ounces (oz) whole milk             4.5 g               25 mg   8 ounces skim milk                   0.36 g               5 mg   1 tablespoon butter                  7 g                 30 mg   4 tablespoon sour cream              5.5 g               24 mg   3 oz. pork tenderloin                2 g                 65 mg   3 oz. pork sausage                   7.5 g               70 mg   3 oz sirloin steak                   3 g                 76 mg   3 oz beef ribs                       5 g                 69 mg   3 oz chicken breast without skin      1 g                 73 mg   3 oz chicken thigh with skin         3.7 g               79 mg   3 oz shrimp                          0.25               166 mg   3 oz salmon                          1.5                 50 mg   1/2 cup vegetable shortening        25.5 g                0 mg   ---------------------------------------------------------------------     How much cholesterol can I have in my diet?   The guidelines for cholesterol in the diet depend on your medical condition. The recommendations are:   less than 200 mg a day if you have high cholesterol or heart disease   less than 300 mg of cholesterol a day if you do not have high cholesterol or heart disease.   Everyone should try to avoid saturated and trans fats.   Limiting cholesterol, saturated fat, and trans fat is easy if you get in the habit of cooking lean. Choose the leanest cuts of meats and dairy products, including more fish and less processed food. Some plant foods, such as palm oil, coconut oil, and cocoa butter do contain saturated fat, but it is not known if these fats have the same harmful effect on the heart as the saturated fat in animal products. Plant foods, such as grains, fruits, vegetables, vegetable oils, nuts, and seeds, do not contain any cholesterol.     Published by [employer].   This content is reviewed periodically and is subject to change as new health information becomes available. The information is intended to inform and educate and is not a replacement for medical evaluation, advice, diagnosis or treatment by a healthcare professional.   Anya iTwari RD, CDE   ? 2010 [facility] and/or its affiliates. All Rights Reserved.                Associated Results         HIV Antigen Antibody Combo   Status:  Final result   Visible to patient:  No (Not Released) Dx:  Screening for human immunodeficiency ... Order: [id number]       Notes Recorded by Santa Martin NP on 6/18/2018 at 3:54 PM  Please send a letter to the patient  with the results. Cholesterol is elevated.  Continue your prescription medications.  Try to incorporate some of the below tips into your daily routine, to help improve your cholesterol.  Follow up as needed.     NATALIIA Perez                   Controlling Cholesterol  What is cholesterol?   Cholesterol is a fatty substance. It has both good and bad effects on the body. Your body needs small amounts of cholesterol to make hormones and to build and maintain cells. However, when your body has too much cholesterol, deposits of fat called plaque form inside the walls of your blood vessels (arteries). The blood vessel walls thicken and the vessels become narrower. This is a condition called atherosclerosis. These changes make it harder for blood to flow through the blood vessels, increasing your risk of heart disease, heart attack, and stroke. The plaque can also easily break off and cause a blockage. When the artery is blocked, no blood can flow through it. This prevents the heart muscle from getting oxygen and can cause a heart attack. If a piece of plaque breaks off and flows to the brain, it can cause a stroke.   Most of the cholesterol in your blood is made by your liver from the fats, carbohydrates, and proteins you eat. You also get cholesterol by eating animal products such as meat, eggs, and high-fat dairy products such as whole milk, cream, and real butter.   It is important to find out what your cholesterol numbers are because lowering cholesterol levels that are too high lessens your risk for developing heart disease. It reduces the chance of a heart attack or death from heart disease, even if you already have heart disease.   How is cholesterol measured?   When you get your cholesterol checked, your healthcare provider will give you a number for your total cholesterol level. You can use the chart below to see if your total cholesterol is high.     Total Cholesterol Level  "(mg/dL)  ----------------------------------------  less than 200   good  200 to 239      borderline high  240 or above    high  ----------------------------------------    When your total cholesterol is measured and found to be high, your healthcare provider may also check the amount of LDL (low-density lipoprotein) and HDL (high-density lipoprotein) in your blood. LDL and HDL carry cholesterol through your blood. LDL carries a lot of cholesterol, leaves behind fatty deposits on your artery walls, and contributes to heart disease. HDL does the opposite. HDL cleans the artery walls and removes extra cholesterol from the body, thus lowering the risk of heart disease. LDL cholesterol is called bad cholesterol. (You can think of \"L\" for \"lousy\" cholesterol.) HDL cholesterol is called good cholesterol (think of \"H\" for \"healthy\" cholesterol). It is good to have low levels of LDL and high levels of HDL.   Because HDL cholesterol protects against heart disease, higher numbers are better. HDL levels of 60 mg/dL or more help to lower your risk for heart disease. A level equal to or less than 40 mg/dL is low and is considered a major risk factor because it increases your risk for developing heart disease   The level of LDL cholesterol that is healthy for you depends on your risk of heart disease and heart attack. In general, the higher your LDL level and the more risk factors you have for heart disease, the greater your chances of developing heart disease or having a heart attack. These are the recommended goals for LDL, according to risk level:   The goal is less than 160 mg/dL if your risk of heart disease is low.   The goal is less than 130 mg/dL if you have a moderate risk.   The goal is less than 100 mg/dL if you have a high risk of heart disease or you already have heart disease or diabetes.   For many people with heart disease, especially if they also have diabetes, the goal is less than 70 mg/dL.   Lowering " cholesterol, especially the LDL, is connected or linked with:   Slowing, stopping, or even reversing the buildup of plaque   Reducing the chances of heart attack by making plaques more stable and less likely to break off or rupture.   This means the chance of having a heart attack is much less.   In addition to high levels of total cholesterol and LDL, major risks for heart disease include:   diabetes   cigarette smoking   high blood pressure (140/90 mm Hg or higher or you are taking blood pressure medicine)   low HDL cholesterol (less than 40 mg/dL)   family history of early heart disease (father or brother diagnosed with heart disease before age 55, or mother or sister diagnosed before age 65)   age 45 or older for men and age 55 or older for women.   If you have diabetes, your risk of heart disease is high. If you do not have diabetes but you have 2 or more of the other risk factors in this list, your risk is moderate to high. Based on your personal and family history, your healthcare provider can help you calculate your risk level.   How can I control my cholesterol level?   High cholesterol may run in families. Know your family history and discuss it with your healthcare provider.   You can often control cholesterol levels by   eating right   exercising   not smoking   losing weight if you are overweight.   If you have a high risk for heart disease, your healthcare provider may prescribe cholesterol-lowering medicine as well as changes in lifestyle.   Eat right.  Follow these diet guidelines to help control your cholesterol:   Limit the cholesterol in your diet to less than 300 mg per day. If you have heart disease, limit cholesterol to less than 200 mg per day.   Be careful about the amounts and types of fat that you eat. Fats should contribute no more than 25 to 35% of your daily calories. Most of your dietary fat should be from polyunsaturated and monounsaturated fats, which are healthier than saturated fat  "and trans fats.   Saturated fat raises your blood cholesterol because it makes it hard for the body to clear the cholesterol away. Less than 7 to 10% of your calories should come from saturated fat. Saturated fat is found in different amounts in almost all foods. Butter, some oils, meat, and poultry fat contain a lot of saturated fat.   Trans fatty acids, often called trans fats, tend to raise your bad LDL cholesterol and lower your good HDL cholesterol. Trans fats naturally occur in some foods, mostly in meat and dairy products. But food makers can create trans fats when they are preparing food for grocery stores. This is usually done by adding hydrogen to fats. If the list of ingredients of a food product includes the words \"partially hydrogenated\" (usually referring to oils, such as soybean oil and others), the product is likely to contain trans fats. Try to eat as little trans fat as possible. As of January 2006, nutrition labels must list trans fats if the food contains them. Check the nutrition bar on the side of the package.   Polyunsaturated fats are found in fish and some vegetable oils. Monounsaturated fats are found in olive oil, canola oil, and avocados. Both types of these healthier fats are also found in many nuts and legumes.   Adjust the amount of calories you eat and exercise regularly, according to your healthcare provider's exercise prescription, to maintain your recommended body weight.   To control the cholesterol and types and amounts of fat you eat:   Check food labels for fat and cholesterol content. Choose the foods with less fat per serving.   Limit the amount of butter and margarine you eat.   Use olive, canola, sunflower, safflower, soybean, or corn oil. Avoid tropical oils such as palm or coconut oil. Also avoid oils that have been hydrogenated or partially hydrogenated.   Use salad dressings and margarine made with polyunsaturated and monounsaturated fats.   Use egg whites or egg " substitutes rather than whole eggs.   Replace whole-milk dairy products with nonfat or low-fat milk, cheese, spreads, and yogurt.   Eat skinless chicken, turkey, fish, and meatless entrees more often than red meat.   Choose lean cuts of meat and trim off all visible fat. Keep portion sizes moderate.   Avoid fatty desserts such as ice cream, cream-filled cakes, and cheesecakes. Choose fresh fruits, nonfat frozen yogurt, Popsicles, etc.   Reduce the amount of fried foods, vending machine food, and fast food you eat.   Eat several daily servings of fruits and vegetables (especially fresh fruits and leafy vegetables), beans, and whole grains (such as whole wheat, bran, brown rice, oats, and oatmeal). The fiber in these foods helps lower cholesterol.   Eat 4 to 5 servings of nuts a week. Examples of nuts that can be a part of a healthy diet are walnuts, almonds, hazelnuts, peanuts, pecans, and pistachio nuts.   Look for low-fat or nonfat varieties of the foods you like to eat, or look for substitutes.   Exercise.  Exercise goes hand-in-hand with a healthy diet for controlling cholesterol. Exercise helps because it:   Keeps your weight down.   Decreases your total cholesterol level.   Decreases your LDL (bad cholesterol).   Increases your HDL (good cholesterol).   A good exercise program includes aerobic exercise. Aerobic exercise is any activity that keeps your heart rate up (such as swimming, jogging, walking, and bicycling). You should get at least 30 minutes of moderate aerobic exercise most days of the week. Moderate aerobic exercise is generally defined as requiring the energy it takes to walk 2 miles in 30 minutes. You may need to exercise 60 minutes a day to prevent weight gain and 90 minutes a day to lose weight.   If you haven't been exercising, ask your healthcare provider for an exercise prescription and start your new exercise program slowly.   Don't smoke.  Do not smoke. Smoking increases your risk of heart  "disease because it lowers HDL levels, increases your risk of blood clots, and decreases oxygen to the tissues.   Lose excess weight.  Extra weight increases your risk for heart and blood vessel disease. One way it does this is by causing your LDL (\"bad\") cholesterol to go up. Extra weight can also make you tired. It takes a lot of energy to carry all those pounds around. The result is that you are less active. This can mean that you don't get enough exercise and gain even more weight.   Losing excess weight:   Improves not only the bad LDL cholesterol but other blood fats as well.   Lowers your risk for heart attack or stroke.   Increases your energy and helps you feel better (both physically and mentally) and become more active.   Your weight is primarily the result of 2 factors. One is the number of calories you consume. The other is the number of calories you \"burn\". If you eat more calories than you use, your body will store the extra calories as fat and your weight will go up. If your body uses more calories than you eat, you will lose weight.   Here are some things you can do to lose weight.   Talk to your healthcare provider about your weight. Ask how a change in diet and exercise will change your cholesterol levels. Plan for gradual weight loss, just 1 or 2 pounds per week   Eat fewer calories.   Get more physical activity.   Keep a diary of your food and exercise for a couple of weeks to become more aware of your habits.   In summary, changes that you can make in your lifestyle to control your cholesterol level are:   Eat healthy.   Get regular exercise.   Don't smoke.   Keep a healthy weight.   Have your cholesterol levels checked as often as your provider recommends.   Cholesterol in the Diet  Cholesterol is a fatty substance in your body and in foods made from animals. There is a lot of it in meat, including beef, pork, chicken, and turkey. Whole-milk dairy products, egg yolks, and shellfish also have a " lot of cholesterol.   Your body needs cholesterol to make hormones and build nerve cells. You don't have to get it from food because your body makes cholesterol. If you eat too many foods high in cholesterol or saturated fat you can get too much cholesterol. It can cause high levels of cholesterol in the blood. High cholesterol increases your risk for heart disease.   How are saturated fat and trans fats related to cholesterol levels?   Like cholesterol, saturated fats are found mostly in animal products. Limiting the saturated fat in your diet is just as important as limiting cholesterol because your body makes more cholesterol when you eat saturated fat. Trans fats are another type of fat in animal products. Trans fats are also in many processed foods, such as cakes, cookies and potato chips. Like saturated fat, trans fats raise cholesterol levels in the blood.   How much cholesterol do animal products have?   As the table below shows, some foods have more cholesterol and saturated fat than others. They may be high in both, or low in both, or high in one but not the other. The healthiest diets include mostly foods that are low in cholesterol, saturated, and trans fat.   Most foods in the meat group have about the same amount of cholesterol per serving, regardless of the type or cut of meat. However, the amount of saturated fat in these various meats can be very different. High-fat cuts, such as prime rib and dark-meat poultry with the skin, contain a lot more saturated fat than lean cuts, such as pork tenderloin and chicken breast without skin.   Whole-milk dairy products, such as whole milk, cheese, ice cream, sour cream, and butter, have a lot of cholesterol and saturated fat. The good news is that food producers can remove both cholesterol and saturated fat from dairy foods. When dairy is skimmed of its fats, the cholesterol is skimmed off along with it. Skim (nonfat) dairy products are a healthy food choice.    Shellfish are low in saturated fat. Some shellfish are high in cholesterol, but the saturated fat is so low that these foods are still healthy. Fin fish, such as salmon, tuna, trout, and halibut, are relatively low in cholesterol and saturated fat.   Cholesterol and Saturated Fat Content of Selected Foods     Food                                 Fat             Cholesterol                                      (grams)         (milligrams)  --------------------------------------------------------------------  8 ounces (oz) whole milk             4.5 g               25 mg  8 ounces skim milk                   0.36 g               5 mg  1 tablespoon butter                  7 g                 30 mg  4 tablespoon sour cream              5.5 g               24 mg  3 oz. pork tenderloin                2 g                 65 mg  3 oz. pork sausage                   7.5 g               70 mg  3 oz sirloin steak                   3 g                 76 mg  3 oz beef ribs                       5 g                 69 mg  3 oz chicken breast without skin     1 g                 73 mg  3 oz chicken thigh with skin         3.7 g               79 mg  3 oz shrimp                          0.25               166 mg  3 oz salmon                          1.5                 50 mg  1/2 cup vegetable shortening        25.5 g                0 mg   ---------------------------------------------------------------------    How much cholesterol can I have in my diet?   The guidelines for cholesterol in the diet depend on your medical condition. The recommendations are:   less than 200 mg a day if you have high cholesterol or heart disease   less than 300 mg of cholesterol a day if you do not have high cholesterol or heart disease.   Everyone should try to avoid saturated and trans fats.   Limiting cholesterol, saturated fat, and trans fat is easy if you get in the habit of cooking lean. Choose the leanest cuts of meats and dairy products,  including more fish and less processed food. Some plant foods, such as palm oil, coconut oil, and cocoa butter do contain saturated fat, but it is not known if these fats have the same harmful effect on the heart as the saturated fat in animal products. Plant foods, such as grains, fruits, vegetables, vegetable oils, nuts, and seeds, do not contain any cholesterol.          If you have any questions or concerns, please call the clinic at the number listed above.       Sincerely,        Santa Martin NP/[facility]

## 2018-06-12 NOTE — NURSING NOTE
"Chief Complaint   Patient presents with     Recheck Medication       Initial /74  Pulse 75  Temp 98.1  F (36.7  C) (Oral)  Resp 16  Wt 207 lb (93.9 kg)  SpO2 94%  BMI 39.11 kg/m2 Estimated body mass index is 39.11 kg/(m^2) as calculated from the following:    Height as of 3/19/18: 5' 1\" (1.549 m).    Weight as of this encounter: 207 lb (93.9 kg).  Medication Reconciliation: complete     Shahnaz Whatley MA  "

## 2018-06-13 LAB — HIV 1+2 AB+HIV1 P24 AG SERPL QL IA: NONREACTIVE

## 2018-06-16 ENCOUNTER — TELEPHONE (OUTPATIENT)
Dept: NURSING | Facility: CLINIC | Age: 58
End: 2018-06-16

## 2018-06-16 ENCOUNTER — NURSE TRIAGE (OUTPATIENT)
Dept: NURSING | Facility: CLINIC | Age: 58
End: 2018-06-16

## 2018-06-16 DIAGNOSIS — L71.0 PERIORAL DERMATITIS: ICD-10-CM

## 2018-06-16 DIAGNOSIS — E03.9 ACQUIRED HYPOTHYROIDISM: ICD-10-CM

## 2018-06-16 DIAGNOSIS — E78.5 HYPERLIPIDEMIA WITH TARGET LDL LESS THAN 160: ICD-10-CM

## 2018-06-16 DIAGNOSIS — G44.219 EPISODIC TENSION-TYPE HEADACHE, NOT INTRACTABLE: ICD-10-CM

## 2018-06-16 RX ORDER — METRONIDAZOLE 7.5 MG/G
GEL TOPICAL 2 TIMES DAILY
Qty: 180 G | Refills: 0 | Status: SHIPPED | OUTPATIENT
Start: 2018-06-16 | End: 2021-08-19

## 2018-06-16 RX ORDER — PRAVASTATIN SODIUM 40 MG
40 TABLET ORAL DAILY
Qty: 360 TABLET | Refills: 0 | Status: SHIPPED | OUTPATIENT
Start: 2018-06-16 | End: 2019-03-05

## 2018-06-16 RX ORDER — NAPROXEN 500 MG/1
500 TABLET ORAL 2 TIMES DAILY PRN
Qty: 720 TABLET | Refills: 0 | Status: SHIPPED | OUTPATIENT
Start: 2018-06-16 | End: 2021-08-19

## 2018-06-16 RX ORDER — LEVOTHYROXINE SODIUM 175 UG/1
175 TABLET ORAL DAILY
Qty: 30 TABLET | Refills: 0 | Status: SHIPPED | OUTPATIENT
Start: 2018-06-16 | End: 2019-03-05

## 2018-06-16 RX ORDER — LEVOTHYROXINE SODIUM 175 UG/1
175 TABLET ORAL DAILY
Qty: 360 TABLET | Refills: 0 | Status: SHIPPED | OUTPATIENT
Start: 2018-06-16 | End: 2018-06-16

## 2018-06-16 NOTE — TELEPHONE ENCOUNTER
Patient called to move prescription to [facility] in [place] instead of [facility] which is closed. All 4 prescriptions sent, as requested, to [facility] pharmacy in [place], [place].  Susana Tracey RN-[facility]

## 2018-06-16 NOTE — TELEPHONE ENCOUNTER
Wants all prescriptions sent to [facility] in [place]. Sent within scope of practice.  Susana Tracey RN-[facility]

## 2018-06-16 NOTE — TELEPHONE ENCOUNTER
Aziza requesting 30 day supply of Synthroid be forwarded to [place] pharmacy.  Sent to [facility] ([place]).  Was unable to get from other pharmacy, as too soon to fill per insurance.  Plans to pay out of pocket, leaving [facility] on Monday.      Haley Boateng RN  [facility]

## 2018-06-18 NOTE — PROGRESS NOTES
Please send a letter to the patient with the results. Cholesterol is elevated.  Continue your prescription medications.  Try to incorporate some of the below tips into your daily routine, to help improve your cholesterol.  Follow up as needed.     NATALIIA Perez                     Controlling Cholesterol  What is cholesterol?   Cholesterol is a fatty substance. It has both good and bad effects on the body. Your body needs small amounts of cholesterol to make hormones and to build and maintain cells. However, when your body has too much cholesterol, deposits of fat called plaque form inside the walls of your blood vessels (arteries). The blood vessel walls thicken and the vessels become narrower. This is a condition called atherosclerosis. These changes make it harder for blood to flow through the blood vessels, increasing your risk of heart disease, heart attack, and stroke. The plaque can also easily break off and cause a blockage. When the artery is blocked, no blood can flow through it. This prevents the heart muscle from getting oxygen and can cause a heart attack. If a piece of plaque breaks off and flows to the brain, it can cause a stroke.   Most of the cholesterol in your blood is made by your liver from the fats, carbohydrates, and proteins you eat. You also get cholesterol by eating animal products such as meat, eggs, and high-fat dairy products such as whole milk, cream, and real butter.   It is important to find out what your cholesterol numbers are because lowering cholesterol levels that are too high lessens your risk for developing heart disease. It reduces the chance of a heart attack or death from heart disease, even if you already have heart disease.   How is cholesterol measured?   When you get your cholesterol checked, your healthcare provider will give you a number for your total cholesterol level. You can use the chart below to see if your total cholesterol is high.     Total Cholesterol  "Level (mg/dL)  ----------------------------------------  less than 200   good  200 to 239      borderline high  240 or above    high  ----------------------------------------    When your total cholesterol is measured and found to be high, your healthcare provider may also check the amount of LDL (low-density lipoprotein) and HDL (high-density lipoprotein) in your blood. LDL and HDL carry cholesterol through your blood. LDL carries a lot of cholesterol, leaves behind fatty deposits on your artery walls, and contributes to heart disease. HDL does the opposite. HDL cleans the artery walls and removes extra cholesterol from the body, thus lowering the risk of heart disease. LDL cholesterol is called bad cholesterol. (You can think of \"L\" for \"lousy\" cholesterol.) HDL cholesterol is called good cholesterol (think of \"H\" for \"healthy\" cholesterol). It is good to have low levels of LDL and high levels of HDL.   Because HDL cholesterol protects against heart disease, higher numbers are better. HDL levels of 60 mg/dL or more help to lower your risk for heart disease. A level equal to or less than 40 mg/dL is low and is considered a major risk factor because it increases your risk for developing heart disease   The level of LDL cholesterol that is healthy for you depends on your risk of heart disease and heart attack. In general, the higher your LDL level and the more risk factors you have for heart disease, the greater your chances of developing heart disease or having a heart attack. These are the recommended goals for LDL, according to risk level:   The goal is less than 160 mg/dL if your risk of heart disease is low.   The goal is less than 130 mg/dL if you have a moderate risk.   The goal is less than 100 mg/dL if you have a high risk of heart disease or you already have heart disease or diabetes.   For many people with heart disease, especially if they also have diabetes, the goal is less than 70 mg/dL.   Lowering " cholesterol, especially the LDL, is connected or linked with:   Slowing, stopping, or even reversing the buildup of plaque   Reducing the chances of heart attack by making plaques more stable and less likely to break off or rupture.   This means the chance of having a heart attack is much less.   In addition to high levels of total cholesterol and LDL, major risks for heart disease include:   diabetes   cigarette smoking   high blood pressure (140/90 mm Hg or higher or you are taking blood pressure medicine)   low HDL cholesterol (less than 40 mg/dL)   family history of early heart disease (father or brother diagnosed with heart disease before age 55, or mother or sister diagnosed before age 65)   age 45 or older for men and age 55 or older for women.   If you have diabetes, your risk of heart disease is high. If you do not have diabetes but you have 2 or more of the other risk factors in this list, your risk is moderate to high. Based on your personal and family history, your healthcare provider can help you calculate your risk level.   How can I control my cholesterol level?   High cholesterol may run in families. Know your family history and discuss it with your healthcare provider.   You can often control cholesterol levels by   eating right   exercising   not smoking   losing weight if you are overweight.   If you have a high risk for heart disease, your healthcare provider may prescribe cholesterol-lowering medicine as well as changes in lifestyle.   Eat right.  Follow these diet guidelines to help control your cholesterol:   Limit the cholesterol in your diet to less than 300 mg per day. If you have heart disease, limit cholesterol to less than 200 mg per day.   Be careful about the amounts and types of fat that you eat. Fats should contribute no more than 25 to 35% of your daily calories. Most of your dietary fat should be from polyunsaturated and monounsaturated fats, which are healthier than saturated fat  "and trans fats.   Saturated fat raises your blood cholesterol because it makes it hard for the body to clear the cholesterol away. Less than 7 to 10% of your calories should come from saturated fat. Saturated fat is found in different amounts in almost all foods. Butter, some oils, meat, and poultry fat contain a lot of saturated fat.   Trans fatty acids, often called trans fats, tend to raise your bad LDL cholesterol and lower your good HDL cholesterol. Trans fats naturally occur in some foods, mostly in meat and dairy products. But food makers can create trans fats when they are preparing food for grocery stores. This is usually done by adding hydrogen to fats. If the list of ingredients of a food product includes the words \"partially hydrogenated\" (usually referring to oils, such as soybean oil and others), the product is likely to contain trans fats. Try to eat as little trans fat as possible. As of January 2006, nutrition labels must list trans fats if the food contains them. Check the nutrition bar on the side of the package.   Polyunsaturated fats are found in fish and some vegetable oils. Monounsaturated fats are found in olive oil, canola oil, and avocados. Both types of these healthier fats are also found in many nuts and legumes.   Adjust the amount of calories you eat and exercise regularly, according to your healthcare provider's exercise prescription, to maintain your recommended body weight.   To control the cholesterol and types and amounts of fat you eat:   Check food labels for fat and cholesterol content. Choose the foods with less fat per serving.   Limit the amount of butter and margarine you eat.   Use olive, canola, sunflower, safflower, soybean, or corn oil. Avoid tropical oils such as palm or coconut oil. Also avoid oils that have been hydrogenated or partially hydrogenated.   Use salad dressings and margarine made with polyunsaturated and monounsaturated fats.   Use egg whites or egg " substitutes rather than whole eggs.   Replace whole-milk dairy products with nonfat or low-fat milk, cheese, spreads, and yogurt.   Eat skinless chicken, turkey, fish, and meatless entrees more often than red meat.   Choose lean cuts of meat and trim off all visible fat. Keep portion sizes moderate.   Avoid fatty desserts such as ice cream, cream-filled cakes, and cheesecakes. Choose fresh fruits, nonfat frozen yogurt, Popsicles, etc.   Reduce the amount of fried foods, vending machine food, and fast food you eat.   Eat several daily servings of fruits and vegetables (especially fresh fruits and leafy vegetables), beans, and whole grains (such as whole wheat, bran, brown rice, oats, and oatmeal). The fiber in these foods helps lower cholesterol.   Eat 4 to 5 servings of nuts a week. Examples of nuts that can be a part of a healthy diet are walnuts, almonds, hazelnuts, peanuts, pecans, and pistachio nuts.   Look for low-fat or nonfat varieties of the foods you like to eat, or look for substitutes.   Exercise.  Exercise goes hand-in-hand with a healthy diet for controlling cholesterol. Exercise helps because it:   Keeps your weight down.   Decreases your total cholesterol level.   Decreases your LDL (bad cholesterol).   Increases your HDL (good cholesterol).   A good exercise program includes aerobic exercise. Aerobic exercise is any activity that keeps your heart rate up (such as swimming, jogging, walking, and bicycling). You should get at least 30 minutes of moderate aerobic exercise most days of the week. Moderate aerobic exercise is generally defined as requiring the energy it takes to walk 2 miles in 30 minutes. You may need to exercise 60 minutes a day to prevent weight gain and 90 minutes a day to lose weight.   If you haven't been exercising, ask your healthcare provider for an exercise prescription and start your new exercise program slowly.   Don't smoke.  Do not smoke. Smoking increases your risk of heart  "disease because it lowers HDL levels, increases your risk of blood clots, and decreases oxygen to the tissues.   Lose excess weight.  Extra weight increases your risk for heart and blood vessel disease. One way it does this is by causing your LDL (\"bad\") cholesterol to go up. Extra weight can also make you tired. It takes a lot of energy to carry all those pounds around. The result is that you are less active. This can mean that you don't get enough exercise and gain even more weight.   Losing excess weight:   Improves not only the bad LDL cholesterol but other blood fats as well.   Lowers your risk for heart attack or stroke.   Increases your energy and helps you feel better (both physically and mentally) and become more active.   Your weight is primarily the result of 2 factors. One is the number of calories you consume. The other is the number of calories you \"burn\". If you eat more calories than you use, your body will store the extra calories as fat and your weight will go up. If your body uses more calories than you eat, you will lose weight.   Here are some things you can do to lose weight.   Talk to your healthcare provider about your weight. Ask how a change in diet and exercise will change your cholesterol levels. Plan for gradual weight loss, just 1 or 2 pounds per week   Eat fewer calories.   Get more physical activity.   Keep a diary of your food and exercise for a couple of weeks to become more aware of your habits.   In summary, changes that you can make in your lifestyle to control your cholesterol level are:   Eat healthy.   Get regular exercise.   Don't smoke.   Keep a healthy weight.   Have your cholesterol levels checked as often as your provider recommends.   Cholesterol in the Diet  Cholesterol is a fatty substance in your body and in foods made from animals. There is a lot of it in meat, including beef, pork, chicken, and turkey. Whole-milk dairy products, egg yolks, and shellfish also have a " lot of cholesterol.   Your body needs cholesterol to make hormones and build nerve cells. You don't have to get it from food because your body makes cholesterol. If you eat too many foods high in cholesterol or saturated fat you can get too much cholesterol. It can cause high levels of cholesterol in the blood. High cholesterol increases your risk for heart disease.   How are saturated fat and trans fats related to cholesterol levels?   Like cholesterol, saturated fats are found mostly in animal products. Limiting the saturated fat in your diet is just as important as limiting cholesterol because your body makes more cholesterol when you eat saturated fat. Trans fats are another type of fat in animal products. Trans fats are also in many processed foods, such as cakes, cookies and potato chips. Like saturated fat, trans fats raise cholesterol levels in the blood.   How much cholesterol do animal products have?   As the table below shows, some foods have more cholesterol and saturated fat than others. They may be high in both, or low in both, or high in one but not the other. The healthiest diets include mostly foods that are low in cholesterol, saturated, and trans fat.   Most foods in the meat group have about the same amount of cholesterol per serving, regardless of the type or cut of meat. However, the amount of saturated fat in these various meats can be very different. High-fat cuts, such as prime rib and dark-meat poultry with the skin, contain a lot more saturated fat than lean cuts, such as pork tenderloin and chicken breast without skin.   Whole-milk dairy products, such as whole milk, cheese, ice cream, sour cream, and butter, have a lot of cholesterol and saturated fat. The good news is that food producers can remove both cholesterol and saturated fat from dairy foods. When dairy is skimmed of its fats, the cholesterol is skimmed off along with it. Skim (nonfat) dairy products are a healthy food choice.    Shellfish are low in saturated fat. Some shellfish are high in cholesterol, but the saturated fat is so low that these foods are still healthy. Fin fish, such as salmon, tuna, trout, and halibut, are relatively low in cholesterol and saturated fat.   Cholesterol and Saturated Fat Content of Selected Foods     Food                                 Fat             Cholesterol                                      (grams)         (milligrams)  --------------------------------------------------------------------  8 ounces (oz) whole milk             4.5 g               25 mg  8 ounces skim milk                   0.36 g               5 mg  1 tablespoon butter                  7 g                 30 mg  4 tablespoon sour cream              5.5 g               24 mg  3 oz. pork tenderloin                2 g                 65 mg  3 oz. pork sausage                   7.5 g               70 mg  3 oz sirloin steak                   3 g                 76 mg  3 oz beef ribs                       5 g                 69 mg  3 oz chicken breast without skin     1 g                 73 mg  3 oz chicken thigh with skin         3.7 g               79 mg  3 oz shrimp                          0.25               166 mg  3 oz salmon                          1.5                 50 mg  1/2 cup vegetable shortening        25.5 g                0 mg   ---------------------------------------------------------------------    How much cholesterol can I have in my diet?   The guidelines for cholesterol in the diet depend on your medical condition. The recommendations are:   less than 200 mg a day if you have high cholesterol or heart disease   less than 300 mg of cholesterol a day if you do not have high cholesterol or heart disease.   Everyone should try to avoid saturated and trans fats.   Limiting cholesterol, saturated fat, and trans fat is easy if you get in the habit of cooking lean. Choose the leanest cuts of meats and dairy products,  including more fish and less processed food. Some plant foods, such as palm oil, coconut oil, and cocoa butter do contain saturated fat, but it is not known if these fats have the same harmful effect on the heart as the saturated fat in animal products. Plant foods, such as grains, fruits, vegetables, vegetable oils, nuts, and seeds, do not contain any cholesterol.     Published by [employer].  This content is reviewed periodically and is subject to change as new health information becomes available. The information is intended to inform and educate and is not a replacement for medical evaluation, advice, diagnosis or treatment by a healthcare professional.   Anya Tiwari RD, CDE   ? 2010 [facility] and/or its affiliates. All Rights Reserved.

## 2018-07-19 ENCOUNTER — TELEPHONE (OUTPATIENT)
Dept: FAMILY MEDICINE | Facility: CLINIC | Age: 58
End: 2018-07-19

## 2018-07-19 NOTE — TELEPHONE ENCOUNTER
"See below ,[place] , advised she be seen ,many causes for dizziness ,no other symptoms .Pt continued asking questions if it was thyroid , if it could be brain tumor ? \"Reiterated several times that pt needs to be thoroughly assessed\" she is going to be seen where she is at   "

## 2018-07-19 NOTE — TELEPHONE ENCOUNTER
[place] feeling [place] of dizzy spells. Please advise. It is on the left side of her head. Pt. Does not feel sick in any other way.

## 2019-03-05 ENCOUNTER — APPOINTMENT (OUTPATIENT)
Dept: OPTOMETRY | Facility: CLINIC | Age: 59
End: 2019-03-05
Payer: COMMERCIAL

## 2019-03-05 ENCOUNTER — OFFICE VISIT (OUTPATIENT)
Dept: OPTOMETRY | Facility: CLINIC | Age: 59
End: 2019-03-05
Payer: COMMERCIAL

## 2019-03-05 ENCOUNTER — OFFICE VISIT (OUTPATIENT)
Dept: FAMILY MEDICINE | Facility: CLINIC | Age: 59
End: 2019-03-05
Payer: COMMERCIAL

## 2019-03-05 VITALS
DIASTOLIC BLOOD PRESSURE: 60 MMHG | HEIGHT: 60 IN | WEIGHT: 205.6 LBS | OXYGEN SATURATION: 97 % | TEMPERATURE: 98.3 F | SYSTOLIC BLOOD PRESSURE: 118 MMHG | HEART RATE: 76 BPM | RESPIRATION RATE: 16 BRPM | BODY MASS INDEX: 40.37 KG/M2

## 2019-03-05 DIAGNOSIS — E55.9 VITAMIN D DEFICIENCY: ICD-10-CM

## 2019-03-05 DIAGNOSIS — H10.13 ALLERGIC CONJUNCTIVITIS, BILATERAL: Primary | ICD-10-CM

## 2019-03-05 DIAGNOSIS — Z12.11 SCREEN FOR COLON CANCER: ICD-10-CM

## 2019-03-05 DIAGNOSIS — Z13.1 SCREENING FOR DIABETES MELLITUS: ICD-10-CM

## 2019-03-05 DIAGNOSIS — H52.223 REGULAR ASTIGMATISM OF BOTH EYES: ICD-10-CM

## 2019-03-05 DIAGNOSIS — H04.123 DRY EYE SYNDROME OF BOTH EYES: ICD-10-CM

## 2019-03-05 DIAGNOSIS — E03.9 ACQUIRED HYPOTHYROIDISM: Primary | ICD-10-CM

## 2019-03-05 DIAGNOSIS — A04.8 HELICOBACTER PYLORI (H. PYLORI) INFECTION: ICD-10-CM

## 2019-03-05 DIAGNOSIS — E66.01 OBESITY, MORBID, BMI 40.0-49.9 (H): ICD-10-CM

## 2019-03-05 DIAGNOSIS — H52.4 PRESBYOPIA: ICD-10-CM

## 2019-03-05 DIAGNOSIS — E78.5 HYPERLIPIDEMIA LDL GOAL <130: ICD-10-CM

## 2019-03-05 PROCEDURE — 92015 DETERMINE REFRACTIVE STATE: CPT | Performed by: OPTOMETRIST

## 2019-03-05 PROCEDURE — 99214 OFFICE O/P EST MOD 30 MIN: CPT | Performed by: PHYSICIAN ASSISTANT

## 2019-03-05 PROCEDURE — V2100 LENS SPHER SINGLE PLANO 4.00: HCPCS | Mod: LT | Performed by: OPTOMETRIST

## 2019-03-05 PROCEDURE — 92004 COMPRE OPH EXAM NEW PT 1/>: CPT | Performed by: OPTOMETRIST

## 2019-03-05 PROCEDURE — V2020 VISION SVCS FRAMES PURCHASES: HCPCS | Performed by: OPTOMETRIST

## 2019-03-05 RX ORDER — PRAVASTATIN SODIUM 40 MG
40 TABLET ORAL DAILY
Qty: 90 TABLET | Refills: 3 | Status: SHIPPED | OUTPATIENT
Start: 2019-03-05 | End: 2021-01-21

## 2019-03-05 RX ORDER — AZELASTINE HYDROCHLORIDE 0.5 MG/ML
1 SOLUTION/ DROPS OPHTHALMIC 2 TIMES DAILY PRN
Qty: 1 BOTTLE | Refills: 11 | Status: SHIPPED | OUTPATIENT
Start: 2019-03-05 | End: 2020-03-05

## 2019-03-05 RX ORDER — LEVOTHYROXINE SODIUM 175 UG/1
175 TABLET ORAL DAILY
Qty: 90 TABLET | Refills: 3 | Status: SHIPPED | OUTPATIENT
Start: 2019-03-05 | End: 2019-08-14

## 2019-03-05 ASSESSMENT — VISUAL ACUITY
OD_SC: 20/60
OD_SC: 20/20
METHOD: SNELLEN - LINEAR
OS_SC+: -2
OS_SC: 20/20
OS_SC: 20/60

## 2019-03-05 ASSESSMENT — REFRACTION_MANIFEST
OS_SPHERE: +0.25
OS_AXIS: 155
OD_ADD: +2.50
OD_SPHERE: PLANO
OS_ADD: +2.50
OS_CYLINDER: +0.25

## 2019-03-05 ASSESSMENT — ANXIETY QUESTIONNAIRES
3. WORRYING TOO MUCH ABOUT DIFFERENT THINGS: MORE THAN HALF THE DAYS
GAD7 TOTAL SCORE: 8
2. NOT BEING ABLE TO STOP OR CONTROL WORRYING: SEVERAL DAYS
IF YOU CHECKED OFF ANY PROBLEMS ON THIS QUESTIONNAIRE, HOW DIFFICULT HAVE THESE PROBLEMS MADE IT FOR YOU TO DO YOUR WORK, TAKE CARE OF THINGS AT HOME, OR GET ALONG WITH OTHER PEOPLE: SOMEWHAT DIFFICULT
1. FEELING NERVOUS, ANXIOUS, OR ON EDGE: NOT AT ALL
6. BECOMING EASILY ANNOYED OR IRRITABLE: SEVERAL DAYS
5. BEING SO RESTLESS THAT IT IS HARD TO SIT STILL: NOT AT ALL
7. FEELING AFRAID AS IF SOMETHING AWFUL MIGHT HAPPEN: NEARLY EVERY DAY

## 2019-03-05 ASSESSMENT — PATIENT HEALTH QUESTIONNAIRE - PHQ9
SUM OF ALL RESPONSES TO PHQ QUESTIONS 1-9: 5
5. POOR APPETITE OR OVEREATING: SEVERAL DAYS

## 2019-03-05 ASSESSMENT — CUP TO DISC RATIO
OD_RATIO: 0.45
OS_RATIO: 0.45

## 2019-03-05 ASSESSMENT — CONF VISUAL FIELD
METHOD: COUNTING FINGERS
OD_NORMAL: 1
OS_NORMAL: 1

## 2019-03-05 ASSESSMENT — SLIT LAMP EXAM - LIDS
COMMENTS: DERMATOCHALASIS
COMMENTS: DERMATOCHALASIS

## 2019-03-05 ASSESSMENT — TONOMETRY
IOP_METHOD: TONOPEN
OS_IOP_MMHG: 17
OD_IOP_MMHG: 15

## 2019-03-05 ASSESSMENT — PAIN SCALES - GENERAL: PAINLEVEL: NO PAIN (0)

## 2019-03-05 ASSESSMENT — MIFFLIN-ST. JEOR: SCORE: 1424.1

## 2019-03-05 ASSESSMENT — EXTERNAL EXAM - RIGHT EYE: OD_EXAM: NORMAL

## 2019-03-05 ASSESSMENT — EXTERNAL EXAM - LEFT EYE: OS_EXAM: NORMAL

## 2019-03-05 NOTE — PATIENT INSTRUCTIONS
Optivar- 1 drop both eyes 2 x day for 3-4 weeks then as needed for itchy eyes.    Refresh tears 1 drop 3-4x daily.    Recommend new glasses.  Your options are a bifocal which would allow you to see distance and near vision or separate glasses for distance and reading.    Recommend returning for dilated fundus exam. It is a more thorough exam of the retina.    Return in 1 year for a complete eye exam or sooner if needed.    Abe Saavedra, OD

## 2019-03-05 NOTE — PROGRESS NOTES
Chief Complaint   Patient presents with     Annual Eye Exam         Last Eye Exam: 10+ years   Dilated Previously: Declined dilation today    What are you currently using to see?  readers       Distance Vision Acuity: Satisfied with vision    Near Vision Acuity: Not satisfied     Eye Comfort: right eye is watery and itchy all the time   Do you use eye drops? : No  Occupation or Hobbies: Take care of Radha Arguello  [employer]            Medical, surgical and family histories reviewed and updated 3/5/2019.       OBJECTIVE: See Ophthalmology exam    ASSESSMENT:    ICD-10-CM    1. Allergic conjunctivitis, bilateral H10.13 azelastine (OPTIVAR) 0.05 % ophthalmic solution   2. Dry eye syndrome of both eyes H04.123 carboxymethylcellulose (REFRESH) 1 % ophthalmic solution   3. Presbyopia H52.4    4. Regular astigmatism of both eyes H52.223       PLAN:     Patient Instructions   Optivar- 1 drop both eyes 2 x day for 3-4 weeks then as needed for itchy eyes.    Refresh tears 1 drop 3-4x daily.    Recommend new glasses.  Your options are a bifocal which would allow you to see distance and near vision or separate glasses for distance and reading.    Recommend returning for dilated fundus exam. It is a more thorough exam of the retina.    Return in 1 year for a complete eye exam or sooner if needed.    Abe Saavedra, OD

## 2019-03-05 NOTE — PROGRESS NOTES
SUBJECTIVE:   Aziza Adan is a 58 year old female who presents to clinic today for the following health issues:    Hyperlipidemia Follow-Up      Rate your low fat/cholesterol diet?: fair    Taking statin?  Yes, no muscle aches from statin    Other lipid medications/supplements?:  none    Hypothyroidism Follow-up      Since last visit, patient describes the following symptoms: hair loss      Amount of exercise or physical activity: 2-3 days/week for an average of less than 15 minutes    Problems taking medications regularly: No    Medication side effects: bones feel like they hurt and hands are swelling    Diet: regular (no restrictions)    Patient also wants to recheck for h.pylori. She is currently asymptomatic, but occasionally still gets epigastric pain that resolve on their own. She has had same pains in the past with h.pylori.       Problem list and histories reviewed & adjusted, as indicated.  Additional history: as documented    Patient Active Problem List   Diagnosis     Adjustment disorder with depressed mood     Hypothyroidism, unspecified hypothyroidism type     Obesity with body mass index of 30.0-39.9     History of vitamin D deficiency     Hyperlipidemia LDL goal <130     Advanced directives, counseling/discussion     Morbid obesity (H)     Past Surgical History:   Procedure Laterality Date     NO HISTORY OF SURGERY         Social History     Tobacco Use     Smoking status: Never Smoker     Smokeless tobacco: Never Used   Substance Use Topics     Alcohol use: No     Alcohol/week: 0.0 oz     Family History   Problem Relation Age of Onset     Diabetes Father      Heart Disease Brother         ???open heart surgery at age 40     Depression Daughter         committed suicide age     Breast Cancer No family hx of      Colorectal Cancer No family hx of      Glaucoma No family hx of      Macular Degeneration No family hx of          Current Outpatient Medications   Medication Sig Dispense Refill  "    amoxicillin (AMOXIL) 500 MG capsule Take 2 capsules (1,000 mg) by mouth 2 times daily for 14 days 56 capsule 0     clarithromycin (BIAXIN) 500 MG tablet Take 1 tablet (500 mg) by mouth 2 times daily for 14 days 28 tablet 0     levothyroxine (SYNTHROID/LEVOTHROID) 175 MCG tablet Take 1 tablet (175 mcg) by mouth daily 90 tablet 3     naproxen (NAPROSYN) 500 MG tablet Take 1 tablet (500 mg) by mouth 2 times daily as needed for moderate pain (take with food) Take with meals 720 tablet 0     omeprazole (PRILOSEC) 20 MG DR capsule Take 1 capsule (20 mg) by mouth daily 30 capsule 1     pravastatin (PRAVACHOL) 40 MG tablet Take 1 tablet (40 mg) by mouth daily 90 tablet 3     azelastine (OPTIVAR) 0.05 % ophthalmic solution Place 1 drop into both eyes 2 times daily as needed (itchy, watery eyes) 1 Bottle 11     carboxymethylcellulose (REFRESH) 1 % ophthalmic solution Place 1 drop into both eyes 4 times daily 15 each 12     metroNIDAZOLE (METROGEL) 0.75 % topical gel Apply topically 2 times daily (Patient not taking: Reported on 3/5/2019) 180 g 0     Allergies   Allergen Reactions     Niacin And Related      \"Shaking all over and turned red\".       Reviewed and updated as needed this visit by clinical staff  Tobacco  Allergies  Meds  Problems  Med Hx  Surg Hx  Fam Hx  Soc Hx        Reviewed and updated as needed this visit by Provider  Tobacco  Allergies  Meds  Problems  Med Hx  Surg Hx  Fam Hx         ROS:  Constitutional, HEENT, cardiovascular, pulmonary, gi and gu systems are negative, except as otherwise noted.    OBJECTIVE:     /60 (BP Location: Right arm, Patient Position: Sitting, Cuff Size: Adult Large)   Pulse 76   Temp 98.3  F (36.8  C) (Oral)   Resp 16   Ht 1.524 m (5')   Wt 93.3 kg (205 lb 9.6 oz)   SpO2 97%   BMI 40.15 kg/m    Body mass index is 40.15 kg/m .  GENERAL: healthy, alert and no distress  NECK: no adenopathy, no asymmetry, masses, or scars and thyroid normal to " palpation  RESP: lungs clear to auscultation - no rales, rhonchi or wheezes  CV: regular rate and rhythm, normal S1 S2, no S3 or S4, no murmur, click or rub, no peripheral edema and peripheral pulses strong  ABDOMEN: soft, nontender, no hepatosplenomegaly, no masses and bowel sounds normal  MS: no gross musculoskeletal defects noted, no edema    Diagnostic Test Results:  pending      ASSESSMENT/PLAN:         ICD-10-CM    1. Acquired hypothyroidism E03.9 **TSH with free T4 reflex FUTURE anytime     TSH with free T4 reflex     levothyroxine (SYNTHROID/LEVOTHROID) 175 MCG tablet     **TSH with free T4 reflex FUTURE anytime     CANCELED: TSH WITH FREE T4 REFLEX   2. Hyperlipidemia LDL goal <130 E78.5 Lipid panel reflex to direct LDL Fasting     Lipid panel reflex to direct LDL Fasting     CANCELED: Lipid panel reflex to direct LDL Fasting   3. Screen for colon cancer Z12.11 Fecal colorectal cancer screen (FIT)   4. Helicobacter pylori (H. pylori) infection A04.8 H Pylori antigen, stool     amoxicillin (AMOXIL) 500 MG capsule     clarithromycin (BIAXIN) 500 MG tablet     omeprazole (PRILOSEC) 20 MG DR capsule   5. Vitamin D deficiency E55.9 **Vitamin D Deficiency FUTURE anytime   6. Screening for diabetes mellitus Z13.1 **A1C FUTURE anytime   7. Obesity, morbid, BMI 40.0-49.9 (H) E66.01 NUTRITION REFERRAL     1. TSH is slightly low and T4 slightly elevated. Patient will continue current dose and recheck in 8 weeks to see if levels stabilized, if not then dose will be decreased.   2. Stable continue Pravastatin  3.FIT is negative   4. H.Pylori is positive again. Patient will be treated with antibiotic Amoxil 1 g twice a day x14 days and Clarithromycin 500 mg twice a day for 14 days Omeprazole daily for 30 days   5. Within normal limits  6. Borderline, watch tour diet and start exercise     Val Wright PA-C  [facility]

## 2019-03-05 NOTE — LETTER
3/5/2019         RE: Aziza Adan  [address] [place]  [facility] [address]        Dear Colleague,    Thank you for referring your patient, Aziza Adan, to the [facility]. Please see a copy of my visit note below.    Chief Complaint   Patient presents with     Annual Eye Exam         Last Eye Exam: 10+ years   Dilated Previously: Declined dilation today    What are you currently using to see?  readers       Distance Vision Acuity: Satisfied with vision    Near Vision Acuity: Not satisfied     Eye Comfort: right eye is watery and itchy all the time   Do you use eye drops? 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       OBJECTIVE: See Ophthalmology exam    ASSESSMENT:    ICD-10-CM    1. Allergic conjunctivitis, bilateral H10.13 azelastine (OPTIVAR) 0.05 % ophthalmic solution   2. Dry eye syndrome of both eyes H04.123 carboxymethylcellulose (REFRESH) 1 % ophthalmic solution   3. Presbyopia H52.4    4. Regular astigmatism of both eyes H52.223       PLAN:     Patient Instructions   Optivar- 1 drop both eyes 2 x day for 3-4 weeks then as needed for itchy eyes.    Refresh tears 1 drop 3-4x daily.    Recommend new glasses.  Your options are a bifocal which would allow you to see distance and near vision or separate glasses for distance and reading.    Recommend returning for dilated fundus exam. It is a more thorough exam of the retina.    Return in 1 year for a complete eye exam or sooner if needed.    Abe Saavedra, SANDRA                   Again, thank you for allowing me to participate in the care of your patient.        Sincerely,        Abe Saavedra, OD

## 2019-03-06 DIAGNOSIS — Z13.1 SCREENING FOR DIABETES MELLITUS: ICD-10-CM

## 2019-03-06 DIAGNOSIS — E78.5 HYPERLIPIDEMIA LDL GOAL <130: ICD-10-CM

## 2019-03-06 DIAGNOSIS — E03.9 ACQUIRED HYPOTHYROIDISM: ICD-10-CM

## 2019-03-06 DIAGNOSIS — Z12.11 SCREEN FOR COLON CANCER: ICD-10-CM

## 2019-03-06 DIAGNOSIS — Z86.19 HISTORY OF HELICOBACTER INFECTION: ICD-10-CM

## 2019-03-06 DIAGNOSIS — E55.9 VITAMIN D DEFICIENCY: ICD-10-CM

## 2019-03-06 LAB
CHOLEST SERPL-MCNC: 198 MG/DL
HBA1C MFR BLD: 5.7 % (ref 0–5.6)
HDLC SERPL-MCNC: 37 MG/DL
HEMOCCULT STL QL IA: NEGATIVE
LDLC SERPL CALC-MCNC: 137 MG/DL
NONHDLC SERPL-MCNC: 161 MG/DL
T4 FREE SERPL-MCNC: 1.47 NG/DL (ref 0.76–1.46)
TRIGL SERPL-MCNC: 119 MG/DL
TSH SERPL DL<=0.005 MIU/L-ACNC: 0.21 MU/L (ref 0.4–4)

## 2019-03-06 PROCEDURE — 80061 LIPID PANEL: CPT | Performed by: PHYSICIAN ASSISTANT

## 2019-03-06 PROCEDURE — 36415 COLL VENOUS BLD VENIPUNCTURE: CPT | Performed by: PHYSICIAN ASSISTANT

## 2019-03-06 PROCEDURE — 82306 VITAMIN D 25 HYDROXY: CPT | Performed by: PHYSICIAN ASSISTANT

## 2019-03-06 PROCEDURE — 87338 HPYLORI STOOL AG IA: CPT | Performed by: PHYSICIAN ASSISTANT

## 2019-03-06 PROCEDURE — 84439 ASSAY OF FREE THYROXINE: CPT | Performed by: PHYSICIAN ASSISTANT

## 2019-03-06 PROCEDURE — 82274 ASSAY TEST FOR BLOOD FECAL: CPT | Performed by: PHYSICIAN ASSISTANT

## 2019-03-06 PROCEDURE — 84443 ASSAY THYROID STIM HORMONE: CPT | Performed by: PHYSICIAN ASSISTANT

## 2019-03-06 PROCEDURE — 83036 HEMOGLOBIN GLYCOSYLATED A1C: CPT | Performed by: PHYSICIAN ASSISTANT

## 2019-03-06 ASSESSMENT — ANXIETY QUESTIONNAIRES: GAD7 TOTAL SCORE: 8

## 2019-03-07 LAB
DEPRECATED CALCIDIOL+CALCIFEROL SERPL-MC: 22 UG/L (ref 20–75)
H PYLORI AG STL QL IA: ABNORMAL
SPECIMEN SOURCE: ABNORMAL

## 2019-03-08 RX ORDER — AMOXICILLIN 500 MG/1
1000 CAPSULE ORAL 2 TIMES DAILY
Qty: 56 CAPSULE | Refills: 0 | Status: SHIPPED | OUTPATIENT
Start: 2019-03-08 | End: 2019-03-22

## 2019-03-08 RX ORDER — CLARITHROMYCIN 500 MG
500 TABLET ORAL 2 TIMES DAILY
Qty: 28 TABLET | Refills: 0 | Status: SHIPPED | OUTPATIENT
Start: 2019-03-08 | End: 2019-03-22

## 2019-03-08 NOTE — RESULT ENCOUNTER NOTE
Please call the patient with these results:      H.pylori is positive, so I have sent prescription for 2 antibiotics and Omeprazole treatment for 2 weeks. Repeat stool test in 1 month after done with treatment.   Cholesterol has improve continue current medication  Vit D is stable  TSH is slightly off lovely, but since its a small range, I want patient to continue current dose and the repeat lab in 8 week to see if levels have stabilized.  FIT test is negative for blood   A1C is slightly elevated, patient needs to reduce fat and sugar in the diet and star exercising. Recheck in 6 months.     Jonna MCKEON

## 2019-08-07 ENCOUNTER — ANCILLARY PROCEDURE (OUTPATIENT)
Dept: GENERAL RADIOLOGY | Facility: CLINIC | Age: 59
End: 2019-08-07
Attending: NURSE PRACTITIONER

## 2019-08-07 ENCOUNTER — OFFICE VISIT (OUTPATIENT)
Dept: URGENT CARE | Facility: URGENT CARE | Age: 59
End: 2019-08-07

## 2019-08-07 VITALS
DIASTOLIC BLOOD PRESSURE: 73 MMHG | SYSTOLIC BLOOD PRESSURE: 126 MMHG | OXYGEN SATURATION: 95 % | WEIGHT: 202.2 LBS | BODY MASS INDEX: 39.49 KG/M2 | HEART RATE: 83 BPM | TEMPERATURE: 98.1 F

## 2019-08-07 DIAGNOSIS — R91.8 INFILTRATE OF LUNG PRESENT ON CHEST X-RAY: ICD-10-CM

## 2019-08-07 DIAGNOSIS — T59.891A INHALATION OF CLEANING AGENT, ACCIDENTAL OR UNINTENTIONAL, INITIAL ENCOUNTER: Primary | ICD-10-CM

## 2019-08-07 DIAGNOSIS — E03.9 ACQUIRED HYPOTHYROIDISM: ICD-10-CM

## 2019-08-07 DIAGNOSIS — R05.9 COUGH: ICD-10-CM

## 2019-08-07 DIAGNOSIS — E78.5 HYPERLIPIDEMIA LDL GOAL <130: ICD-10-CM

## 2019-08-07 LAB
T4 FREE SERPL-MCNC: 1.51 NG/DL (ref 0.76–1.46)
TSH SERPL DL<=0.005 MIU/L-ACNC: 0.05 MU/L (ref 0.4–4)

## 2019-08-07 PROCEDURE — 36415 COLL VENOUS BLD VENIPUNCTURE: CPT | Performed by: PHYSICIAN ASSISTANT

## 2019-08-07 PROCEDURE — 84439 ASSAY OF FREE THYROXINE: CPT | Performed by: PHYSICIAN ASSISTANT

## 2019-08-07 PROCEDURE — 71046 X-RAY EXAM CHEST 2 VIEWS: CPT

## 2019-08-07 PROCEDURE — 99214 OFFICE O/P EST MOD 30 MIN: CPT | Performed by: NURSE PRACTITIONER

## 2019-08-07 PROCEDURE — 84443 ASSAY THYROID STIM HORMONE: CPT | Performed by: PHYSICIAN ASSISTANT

## 2019-08-07 RX ORDER — BENZONATATE 200 MG/1
200 CAPSULE ORAL 3 TIMES DAILY PRN
Qty: 21 CAPSULE | Refills: 0 | Status: SHIPPED | OUTPATIENT
Start: 2019-08-07 | End: 2019-11-14

## 2019-08-07 RX ORDER — AZITHROMYCIN 250 MG/1
TABLET, FILM COATED ORAL
Qty: 6 TABLET | Refills: 0 | Status: SHIPPED | OUTPATIENT
Start: 2019-08-07 | End: 2020-08-27

## 2019-08-07 ASSESSMENT — ENCOUNTER SYMPTOMS
RHINORRHEA: 0
COUGH: 1
DIARRHEA: 0
NAUSEA: 0
SHORTNESS OF BREATH: 0
SORE THROAT: 0
VOMITING: 0
FEVER: 0
HEADACHES: 0
CHILLS: 0

## 2019-08-07 NOTE — PATIENT INSTRUCTIONS
Patient Education     Chemical Inhalation  You have been exposed to chemical fumes. This may cause symptoms of cough, shortness of breath, eye, nose, and throat irritation, and upper chest pain. The treatment is to breathe fresh air. Symptoms should go away completely within 24 hours. Sometimes a pneumonia-like illness can develop within 24 hours after chemical inhalation.  Home care    Breathe fresh air for the next few hours. Avoid closed spaces with poor air flow.    Do not use any cleaning products, paints, mineral spirits, or other volatile chemicals. Avoid areas where these things are being used.    Rest until you are feeling back to normal again. This may take up to 24 hours.    During the next 24 hours, do not smoke cigarettes. Also avoid being around others who are smoking.    If this exposure occurred at work, contact your employer to make sure that work conditions are safe to return to. Proper safety equipment should be used at all times.  Follow-up care  Follow up with your healthcare provider, or as advised.  When to seek medical advice  Call your healthcare provider right away if any of these occur:    Headache, dizziness, fainting    Shortness of breath or wheezing gets worse    Chest pain gets worse    Confusion, drowsiness or seizures    Cough with lots of mucus (sputum)    Fever over 100.4  F (38.0  C), or as directed by your provider  Date Last Reviewed: 3/1/2017    7850-7283 [employer]. [address] [place] [address], [place] [address]. All rights reserved. This information is not intended as a substitute for professional medical care. Always follow your healthcare professional's instructions.

## 2019-08-07 NOTE — PROGRESS NOTES
benzonatSUBJECTIVE:   Aziza Adan is a 58 year old female presenting with a chief complaint of   Chief Complaint   Patient presents with     Cough     Patient inhaled cleaning supplies x 1 month ago        She is an [facility] patient [facility]    Aziza is here with complains of inhaling cleaning chemicals-Clorox, CLR and others 1 month ago.  She has been having a dry cough for the last 1 month.  She denies chest pain, fever, productive cough, or shortness of breath. She has not used any medication or therapy         Review of Systems   Constitutional: Negative for chills and fever.   HENT: Negative for congestion, ear pain, rhinorrhea and sore throat.         Inhaled cleaning chemicals   Respiratory: Positive for cough. Negative for shortness of breath.    Gastrointestinal: Negative for diarrhea, nausea and vomiting.   Neurological: Negative for headaches.   All other systems reviewed and are negative.      Past Medical History:   Diagnosis Date     Adjustment disorder with depressed mood      Hyperlipidemia LDL goal <130      Hypothyroidism (acquired)      Family History   Problem Relation Age of Onset     Diabetes Father      Heart Disease Brother         ???open heart surgery at age 40     Depression Daughter         committed suicide age     Breast Cancer No family hx of      Colorectal Cancer No family hx of      Glaucoma No family hx of      Macular Degeneration No family hx of      Current Outpatient Medications   Medication Sig Dispense Refill     azelastine (OPTIVAR) 0.05 % ophthalmic solution Place 1 drop into both eyes 2 times daily as needed (itchy, watery eyes) 1 Bottle 11     azithromycin (ZITHROMAX) 250 MG tablet Two tablets first day, then one tablet daily for four days. 6 tablet 0     benzonatate (TESSALON) 200 MG capsule Take 1 capsule (200 mg) by mouth 3 times daily as needed for cough 21 capsule 0     carboxymethylcellulose (REFRESH) 1 % ophthalmic solution Place 1 drop into  both eyes 4 times daily 15 each 12     levothyroxine (SYNTHROID/LEVOTHROID) 175 MCG tablet Take 1 tablet (175 mcg) by mouth daily 90 tablet 3     metroNIDAZOLE (METROGEL) 0.75 % topical gel Apply topically 2 times daily 180 g 0     naproxen (NAPROSYN) 500 MG tablet Take 1 tablet (500 mg) by mouth 2 times daily as needed for moderate pain (take with food) Take with meals 720 tablet 0     omeprazole (PRILOSEC) 20 MG DR capsule Take 1 capsule (20 mg) by mouth daily 30 capsule 1     pravastatin (PRAVACHOL) 40 MG tablet TAKE 1 TABLET(40 MG) BY MOUTH DAILY 90 tablet 3     pravastatin (PRAVACHOL) 40 MG tablet Take 1 tablet (40 mg) by mouth daily 90 tablet 3     Social History     Tobacco Use     Smoking status: Never Smoker     Smokeless tobacco: Never Used   Substance Use Topics     Alcohol use: No     Alcohol/week: 0.0 oz       OBJECTIVE  /73 (BP Location: Left arm, Patient Position: Chair, Cuff Size: Adult Regular)   Pulse 83   Temp 98.1  F (36.7  C) (Oral)   Wt 91.7 kg (202 lb 3.2 oz)   SpO2 95%   BMI 39.49 kg/m      Physical Exam   Constitutional: No distress.   HENT:   Head: Normocephalic and atraumatic.   Right Ear: Tympanic membrane and external ear normal.   Left Ear: Tympanic membrane and external ear normal.   Mouth/Throat: Oropharynx is clear and moist.   Eyes: Pupils are equal, round, and reactive to light. EOM are normal.   Neck: Normal range of motion. Neck supple.   Cardiovascular: Normal rate and normal heart sounds.   Pulmonary/Chest: Effort normal and breath sounds normal. No respiratory distress.   Abdominal: Soft.   Lymphadenopathy:     She has no cervical adenopathy.   Neurological: She is alert. No cranial nerve deficit.   Skin: Skin is warm and dry. She is not diaphoretic.   Psychiatric: She has a normal mood and affect.   Nursing note and vitals reviewed.    Results for orders placed or performed in visit on 08/07/19   XR Chest 2 Views    Narrative    XR CHEST 2 VW 8/7/2019 2:02  PM    HISTORY: INhaled cleaning chemicals 1 month ago and has been coughing;  Inhalation of cleaning agent, accidental or unintentional, initial  encounter      Impression    IMPRESSION: Possible focal area of infiltrate or nodularity in the  left lung base posteriorly. Short-term follow-up or CT recommended. No  other findings.    NORA NICHOLSON MD         ASSESSMENT:      ICD-10-CM    1. Inhalation of cleaning agent, accidental or unintentional, initial encounter T59.891A XR Chest 2 Views   2. Cough R05 benzonatate (TESSALON) 200 MG capsule   3. Infiltrate of lung present on chest x-ray R91.8 azithromycin (ZITHROMAX) 250 MG tablet      PLAN:  I discussed  xray results with the patient.  An antibiotic-Azithromycin is ordered and send to pharmacy  Follow-up with primary care provider is advised.  All questions are answered and patient is in agreement with the plan.    Patient Instructions     Patient Education     Chemical Inhalation  You have been exposed to chemical fumes. This may cause symptoms of cough, shortness of breath, eye, nose, and throat irritation, and upper chest pain. The treatment is to breathe fresh air. Symptoms should go away completely within 24 hours. Sometimes a pneumonia-like illness can develop within 24 hours after chemical inhalation.  Home care    Breathe fresh air for the next few hours. Avoid closed spaces with poor air flow.    Do not use any cleaning products, paints, mineral spirits, or other volatile chemicals. Avoid areas where these things are being used.    Rest until you are feeling back to normal again. This may take up to 24 hours.    During the next 24 hours, do not smoke cigarettes. Also avoid being around others who are smoking.    If this exposure occurred at work, contact your employer to make sure that work conditions are safe to return to. Proper safety equipment should be used at all times.  Follow-up care  Follow up with your healthcare provider, or as advised.  When  to seek medical advice  Call your healthcare provider right away if any of these occur:    Headache, dizziness, fainting    Shortness of breath or wheezing gets worse    Chest pain gets worse    Confusion, drowsiness or seizures    Cough with lots of mucus (sputum)    Fever over 100.4  F (38.0  C), or as directed by your provider  Date Last Reviewed: 3/1/2017    2048-8623 [employer]. [address] [place] [address]. All rights reserved. This information is not intended as a substitute for professional medical care. Always follow your healthcare professional's instructions.

## 2019-08-14 DIAGNOSIS — E03.9 ACQUIRED HYPOTHYROIDISM: ICD-10-CM

## 2019-08-14 RX ORDER — LEVOTHYROXINE SODIUM 150 UG/1
150 TABLET ORAL DAILY
Qty: 90 TABLET | Refills: 3 | Status: SHIPPED | OUTPATIENT
Start: 2019-08-14 | End: 2019-11-14

## 2019-08-15 NOTE — RESULT ENCOUNTER NOTE
Please call the patient with these results:  Thyroid is abnormal, showing that patient take too much of Levothyroxine. I have decreased her dose to 150 mcg daily , patient should start this new dose and then follow up in 3 months t recheck thyroid again.     Jonna Wright PAC

## 2019-10-29 DIAGNOSIS — E78.5 HYPERLIPIDEMIA LDL GOAL <130: ICD-10-CM

## 2019-10-29 DIAGNOSIS — E03.9 ACQUIRED HYPOTHYROIDISM: ICD-10-CM

## 2019-10-29 LAB
CHOLEST SERPL-MCNC: 282 MG/DL
HDLC SERPL-MCNC: 40 MG/DL
LDLC SERPL CALC-MCNC: 215 MG/DL
NONHDLC SERPL-MCNC: 242 MG/DL
T4 FREE SERPL-MCNC: 0.59 NG/DL (ref 0.76–1.46)
TRIGL SERPL-MCNC: 135 MG/DL
TSH SERPL DL<=0.005 MIU/L-ACNC: 52.5 MU/L (ref 0.4–4)

## 2019-10-29 PROCEDURE — 80061 LIPID PANEL: CPT | Performed by: PHYSICIAN ASSISTANT

## 2019-10-29 PROCEDURE — 84439 ASSAY OF FREE THYROXINE: CPT | Performed by: PHYSICIAN ASSISTANT

## 2019-10-29 PROCEDURE — 84443 ASSAY THYROID STIM HORMONE: CPT | Performed by: PHYSICIAN ASSISTANT

## 2019-10-29 PROCEDURE — 36415 COLL VENOUS BLD VENIPUNCTURE: CPT | Performed by: PHYSICIAN ASSISTANT

## 2019-11-06 ENCOUNTER — TELEPHONE (OUTPATIENT)
Dept: FAMILY MEDICINE | Facility: CLINIC | Age: 59
End: 2019-11-06

## 2019-11-06 NOTE — TELEPHONE ENCOUNTER
This writer attempted to contact Aziza on 11/06/19      Reason for call results and left message.      If patient calls back:   Registered Nurse called. Follow Triage Call workflow        Rosanne Wilson RN       Notes recorded by Val Wright PA-C on 11/6/2019 at 10:13 AM CST  Please call the patient with these results:    Thyroid is not controlled. Patient most likely does not take her medication Levothyroxine. Lipid is high too.  Please advise patient to follow up in clinic and  to restart her medications.     Jonna Wright PAC

## 2019-11-06 NOTE — RESULT ENCOUNTER NOTE
Please call the patient with these results:    Thyroid is not controlled. Patient most likely does not take her medication Levothyroxine. Lipid is high too.  Please advise patient to follow up in clinic and  to restart her medications.     Jonna Wright PAC

## 2019-11-07 NOTE — TELEPHONE ENCOUNTER
Patient called back and was informed of the results and scheduled in clinic on 11/12/19 for recheck.    Santa Shea RN, [facility]

## 2019-11-14 ENCOUNTER — OFFICE VISIT (OUTPATIENT)
Dept: FAMILY MEDICINE | Facility: CLINIC | Age: 59
End: 2019-11-14
Payer: COMMERCIAL

## 2019-11-14 VITALS
SYSTOLIC BLOOD PRESSURE: 134 MMHG | WEIGHT: 210 LBS | BODY MASS INDEX: 41.23 KG/M2 | HEIGHT: 60 IN | DIASTOLIC BLOOD PRESSURE: 82 MMHG | RESPIRATION RATE: 16 BRPM | TEMPERATURE: 98.1 F | HEART RATE: 80 BPM | OXYGEN SATURATION: 94 %

## 2019-11-14 DIAGNOSIS — Z12.31 ENCOUNTER FOR SCREENING MAMMOGRAM FOR BREAST CANCER: Primary | ICD-10-CM

## 2019-11-14 DIAGNOSIS — E78.5 HYPERLIPIDEMIA WITH TARGET LDL LESS THAN 160: ICD-10-CM

## 2019-11-14 DIAGNOSIS — H04.123 DRY EYES: ICD-10-CM

## 2019-11-14 DIAGNOSIS — E03.9 ACQUIRED HYPOTHYROIDISM: ICD-10-CM

## 2019-11-14 PROCEDURE — 99214 OFFICE O/P EST MOD 30 MIN: CPT | Performed by: PHYSICIAN ASSISTANT

## 2019-11-14 RX ORDER — LEVOTHYROXINE SODIUM 150 UG/1
150 TABLET ORAL DAILY
Qty: 90 TABLET | Refills: 3 | Status: SHIPPED | OUTPATIENT
Start: 2019-11-14 | End: 2020-09-21

## 2019-11-14 RX ORDER — PRAVASTATIN SODIUM 40 MG
40 TABLET ORAL DAILY
Qty: 90 TABLET | Refills: 3 | Status: SHIPPED | OUTPATIENT
Start: 2019-11-14 | End: 2020-05-29

## 2019-11-14 ASSESSMENT — PAIN SCALES - GENERAL: PAINLEVEL: NO PAIN (0)

## 2019-11-14 ASSESSMENT — MIFFLIN-ST. JEOR: SCORE: 1439.05

## 2019-11-14 NOTE — PATIENT INSTRUCTIONS
Blink eye drops apply 1-2 drops three times a day as needed for dry eye sensation    Restart Levothyroxine , take daily , repeat blood work in 8 weeks while on medication     Restart Pravastatin 40 mg daily

## 2019-11-14 NOTE — PROGRESS NOTES
"Subjective     Aziza Adan is a 59 year old female who presents to clinic today for the following health issues:    HPI   Hyperlipidemia Follow-Up      Are you having any of the following symptoms? (Select all that apply)  No complaints of shortness of breath, chest pain or pressure.  No increased sweating or nausea with activity.  No left-sided neck or arm pain.  No complaints of pain in calves when walking 1-2 blocks.    Are you regularly taking any medication or supplement to lower your cholesterol?   Yes- Pravastatin     Are you having muscle aches or other side effects that you think could be caused by your cholesterol lowering medication?  No    Hypothyroidism Follow-up  Patient has been out of medications for a \"while\"       Since last visit, patient describes the following symptoms: Weight stable, no hair loss, no skin changes, no constipation, no loose stools      How many servings of fruits and vegetables do you eat daily?  0-1    On average, how many sweetened beverages do you drink each day (soda, juice, sweet tea, etc)?   0    How many days per week do you miss taking your medication? 0    -patient is leaving to [place] tomorrow, [facility] when she is coming back and would like to get at least 3 months supply.  -patient would like to get a prescription for eye drops due to itchy/watery eye         Patient Active Problem List   Diagnosis     Adjustment disorder with depressed mood     Hypothyroidism, unspecified hypothyroidism type     Obesity with body mass index of 30.0-39.9     History of vitamin D deficiency     Hyperlipidemia LDL goal <130     Advanced directives, counseling/discussion     Morbid obesity (H)     Past Surgical History:   Procedure Laterality Date     NO HISTORY OF SURGERY         Social History     Tobacco Use     Smoking status: Never Smoker     Smokeless tobacco: Never Used   Substance Use Topics     Alcohol use: No     Alcohol/week: 0.0 standard drinks     Family History " "  Problem Relation Age of Onset     Diabetes Father      Heart Disease Brother         ???open heart surgery at age 40     Depression Daughter         committed suicide age     Breast Cancer No family hx of      Colorectal Cancer No family hx of      Glaucoma No family hx of      Macular Degeneration No family hx of          Current Outpatient Medications   Medication Sig Dispense Refill     azelastine (OPTIVAR) 0.05 % ophthalmic solution Place 1 drop into both eyes 2 times daily as needed (itchy, watery eyes) 1 Bottle 11     azithromycin (ZITHROMAX) 250 MG tablet Two tablets first day, then one tablet daily for four days. 6 tablet 0     carboxymethylcellulose (REFRESH) 1 % ophthalmic solution Place 1 drop into both eyes 4 times daily 15 each 12     levothyroxine (SYNTHROID/LEVOTHROID) 150 MCG tablet Take 1 tablet (150 mcg) by mouth daily 90 tablet 3     metroNIDAZOLE (METROGEL) 0.75 % topical gel Apply topically 2 times daily 180 g 0     naproxen (NAPROSYN) 500 MG tablet Take 1 tablet (500 mg) by mouth 2 times daily as needed for moderate pain (take with food) Take with meals 720 tablet 0     omeprazole (PRILOSEC) 20 MG DR capsule Take 1 capsule (20 mg) by mouth daily 30 capsule 1     Polyethylene Glycol 400 (BLINK TEARS) 0.25 % GEL Apply 1-2 drops to eye 3 times daily as needed (dry eyes) 10 mL 11     pravastatin (PRAVACHOL) 40 MG tablet Take 1 tablet (40 mg) by mouth daily 90 tablet 3     pravastatin (PRAVACHOL) 40 MG tablet Take 1 tablet (40 mg) by mouth daily 90 tablet 3     Allergies   Allergen Reactions     Niacin And Related      \"Shaking all over and turned red\".         Reviewed and updated as needed this visit by Provider  Tobacco  Allergies  Meds  Problems  Med Hx  Surg Hx  Fam Hx         Review of Systems   ROS COMP: Constitutional, HEENT, cardiovascular, pulmonary, gi and gu systems are negative, except as otherwise noted.      Objective    /82 (BP Location: Right arm, Patient Position: " Chair, Cuff Size: Adult Regular)   Pulse 80   Temp 98.1  F (36.7  C) (Oral)   Resp 16   Ht 1.524 m (5')   Wt 95.3 kg (210 lb)   SpO2 94%   BMI 41.01 kg/m    Body mass index is 41.01 kg/m .  Physical Exam   GENERAL: healthy, alert and no distress  NECK: no adenopathy, no asymmetry, masses, or scars and thyroid normal to palpation  RESP: lungs clear to auscultation - no rales, rhonchi or wheezes  CV: regular rate and rhythm, normal S1 S2, no S3 or S4, no murmur, click or rub, no peripheral edema and peripheral pulses strong  ABDOMEN: soft, nontender, no hepatosplenomegaly, no masses and bowel sounds normal  MS: no gross musculoskeletal defects noted, no edema    Diagnostic Test Results:  Labs reviewed in Epic        Assessment & Plan       ICD-10-CM    1. Encounter for screening mammogram for breast cancer Z12.31 MA SCREENING DIGITAL BILAT - Future  (s+30)   2. Hyperlipidemia with target LDL less than 160 E78.5 pravastatin (PRAVACHOL) 40 MG tablet   3. Acquired hypothyroidism E03.9 levothyroxine (SYNTHROID/LEVOTHROID) 150 MCG tablet     **TSH with free T4 reflex FUTURE anytime   4. Dry eyes H04.123 Polyethylene Glycol 400 (BLINK TEARS) 0.25 % GEL      Blink eye drops apply 1-2 drops three times a day as needed for dry eye sensation    Restart Levothyroxine , take daily , repeat blood work in 8 weeks while on medication     Restart Pravastatin 40 mg daily  BMI:   Estimated body mass index is 41.01 kg/m  as calculated from the following:    Height as of this encounter: 1.524 m (5').    Weight as of this encounter: 95.3 kg (210 lb).   Weight management plan: Discussed healthy diet and exercise guidelines            Return in about 8 weeks (around 1/9/2020).    Val Wright PA-C  [facility]

## 2020-05-26 DIAGNOSIS — E78.5 HYPERLIPIDEMIA WITH TARGET LDL LESS THAN 160: ICD-10-CM

## 2020-05-29 RX ORDER — PRAVASTATIN SODIUM 40 MG
TABLET ORAL
Qty: 90 TABLET | Refills: 1 | Status: SHIPPED | OUTPATIENT
Start: 2020-05-29 | End: 2020-09-21

## 2020-05-29 NOTE — TELEPHONE ENCOUNTER
Prescription approved per [facility].        Sena Baker RN, BSN, PHN

## 2020-08-27 ENCOUNTER — OFFICE VISIT (OUTPATIENT)
Dept: FAMILY MEDICINE | Facility: CLINIC | Age: 60
End: 2020-08-27
Payer: COMMERCIAL

## 2020-08-27 ENCOUNTER — ANCILLARY PROCEDURE (OUTPATIENT)
Dept: GENERAL RADIOLOGY | Facility: CLINIC | Age: 60
End: 2020-08-27
Attending: PHYSICIAN ASSISTANT
Payer: COMMERCIAL

## 2020-08-27 VITALS
RESPIRATION RATE: 16 BRPM | WEIGHT: 207 LBS | OXYGEN SATURATION: 95 % | SYSTOLIC BLOOD PRESSURE: 138 MMHG | DIASTOLIC BLOOD PRESSURE: 80 MMHG | HEART RATE: 97 BPM | BODY MASS INDEX: 40.64 KG/M2 | HEIGHT: 60 IN

## 2020-08-27 DIAGNOSIS — E66.01 OBESITY, MORBID, BMI 40.0-49.9 (H): ICD-10-CM

## 2020-08-27 DIAGNOSIS — E03.9 HYPOTHYROIDISM, UNSPECIFIED TYPE: ICD-10-CM

## 2020-08-27 DIAGNOSIS — E03.9 ACQUIRED HYPOTHYROIDISM: ICD-10-CM

## 2020-08-27 DIAGNOSIS — E78.5 HYPERLIPIDEMIA WITH TARGET LDL LESS THAN 160: ICD-10-CM

## 2020-08-27 DIAGNOSIS — E78.5 HYPERLIPIDEMIA LDL GOAL <130: ICD-10-CM

## 2020-08-27 DIAGNOSIS — S99.922A INJURY OF TOE ON LEFT FOOT, INITIAL ENCOUNTER: ICD-10-CM

## 2020-08-27 DIAGNOSIS — Z12.31 VISIT FOR SCREENING MAMMOGRAM: ICD-10-CM

## 2020-08-27 DIAGNOSIS — S92.532A CLOSED DISPLACED FRACTURE OF DISTAL PHALANX OF LESSER TOE OF LEFT FOOT, INITIAL ENCOUNTER: Primary | ICD-10-CM

## 2020-08-27 LAB
ANION GAP SERPL CALCULATED.3IONS-SCNC: 10 MMOL/L (ref 3–14)
BUN SERPL-MCNC: 19 MG/DL (ref 7–30)
CALCIUM SERPL-MCNC: 9.2 MG/DL (ref 8.5–10.1)
CHLORIDE SERPL-SCNC: 106 MMOL/L (ref 94–109)
CO2 SERPL-SCNC: 23 MMOL/L (ref 20–32)
CREAT SERPL-MCNC: 0.79 MG/DL (ref 0.52–1.04)
GFR SERPL CREATININE-BSD FRML MDRD: 81 ML/MIN/{1.73_M2}
GLUCOSE SERPL-MCNC: 99 MG/DL (ref 70–99)
HBA1C MFR BLD: 5.6 % (ref 0–5.6)
POTASSIUM SERPL-SCNC: 4.4 MMOL/L (ref 3.4–5.3)
SODIUM SERPL-SCNC: 139 MMOL/L (ref 133–144)
TSH SERPL DL<=0.005 MIU/L-ACNC: 1.05 MU/L (ref 0.4–4)

## 2020-08-27 PROCEDURE — 83036 HEMOGLOBIN GLYCOSYLATED A1C: CPT | Performed by: PHYSICIAN ASSISTANT

## 2020-08-27 PROCEDURE — 36415 COLL VENOUS BLD VENIPUNCTURE: CPT | Performed by: PHYSICIAN ASSISTANT

## 2020-08-27 PROCEDURE — 84443 ASSAY THYROID STIM HORMONE: CPT | Performed by: PHYSICIAN ASSISTANT

## 2020-08-27 PROCEDURE — 73630 X-RAY EXAM OF FOOT: CPT | Mod: LT

## 2020-08-27 PROCEDURE — 99214 OFFICE O/P EST MOD 30 MIN: CPT | Performed by: PHYSICIAN ASSISTANT

## 2020-08-27 PROCEDURE — 80048 BASIC METABOLIC PNL TOTAL CA: CPT | Performed by: PHYSICIAN ASSISTANT

## 2020-08-27 ASSESSMENT — PAIN SCALES - GENERAL: PAINLEVEL: EXTREME PAIN (8)

## 2020-08-27 ASSESSMENT — MIFFLIN-ST. JEOR: SCORE: 1420.45

## 2020-08-27 NOTE — PATIENT INSTRUCTIONS
Wear shoe for 4 weeks   Ibuprofen 600 mg every 6 hours as needed for pain  Apply ice pack as needed   elevate  Patient Education     Closed Toe Fracture  Your toe is broken (fractured). This causes local pain, swelling, and sometimes bruising. This injury usually takes about 4 to 6 weeks to heal, but can sometimes take longer. Toe injuries are often treated by taping the injured toe to the next one (buddy taping). Or a hard shoe, splint or cast may be used. This protects the injured toe and holds it in position.  If the toenail has been severely injured, it may fall off in 1 to 2 weeks. It takes up to 12 months for a new toenail to grow back.  Home care  Follow these guidelines when caring for yourself at home:    You may be given a cast shoe to wear to keep your toe from moving. If not, you can use a sandal or any shoe that doesn t put pressure on the injured toe until the swelling and pain go away. If using a sandal, be careful not to strike your foot against anything. Another injury could make the fracture worse. If you were given crutches, don t put full weight on the injured foot until you can do so without pain, or as directed by your healthcare provider.    Keep your foot elevated to reduce pain and swelling. When sleeping, put a pillow under the injured leg. When sitting, support the injured leg so it is above your heart. This is very important during the first 2 days (48 hours).    Put an ice pack on the injured area. Do this for 20 minutes every 1 to 2 hours the first day for pain relief. You can make an ice pack by wrapping a plastic bag of ice cubes in a thin towel. As the ice melts, be careful that any cloth or paper tape doesn t get wet. Continue using the ice pack 3 to 4 times a day for the next 2 days. Then use the ice pack as needed to ease pain and swelling.    If buddy tape was used and it becomes wet or dirty, change it. You may replace it with paper, plastic, or cloth tape. Cloth tape and paper  tapes must be kept dry.    You may use acetaminophen or ibuprofen to control pain, unless another pain medicine was prescribed. If you have chronic liver or kidney disease, talk with your healthcare provider before using these medicines. Also talk with your provider if you ve had a stomach ulcer or gastrointestinal bleeding.    You may return to sports or physical education activities after 4 weeks when you can run without pain, or as directed by your healthcare provider.  Follow-up care  Follow up with your healthcare provider in 1 week, or as advised. This is to make sure the bone is healing the way it should.  X-rays may be taken. You will be told of any new findings that may affect your care.  When to seek medical advice  Call your healthcare provider right away if any of these occur:    Pain or swelling gets worse    The cast/splint cracks    The cast and padding get wet and stays wet more than 24 hours    Bad odor from the cast/splint or wound fluid stains the cast    Tightness or pressure under the cast/splint gets worse    Toe becomes cold, blue, numb, or tingly    You can t move the toe    Signs of infection: fever, redness, warmth, swelling, or drainage from the wound or cast    Fever of 100.4 F (38 C) or higher, or chills as directed by your healthcare provider  Date Last Reviewed: 2/1/2017 2000-2019 [employer]. [address] [place] [address], [place] [address]. All rights reserved. This information is not intended as a substitute for professional medical care. Always follow your healthcare professional's instructions.

## 2020-08-27 NOTE — PROGRESS NOTES
Subjective     Aziza Adan is a 60 year old female who presents to clinic today for the following health issues:    HPI       Musculoskeletal problem/pain  Onset/Duration: 2 weeks   Description  Location: pinky toe - left  Joint Swelling: YES  Redness: no  Pain: YES- on and off   Warmth: no  Intensity:  moderate  Progression of Symptoms:  intermittent  Accompanying signs and symptoms:   Fevers: no  Numbness/tingling/weakness: YES  History  Trauma to the area: YES- bumped toe into a table   Recent illness:  no  Previous similar problem: no  Previous evaluation:  no  Precipitating or alleviating factors:  Aggravating factors include: wearing shoes   Therapies tried and outcome: nothing    -patient states that has a weird feeling on the bottom of her foot   -patient states that her right eye has been twitching for some time   -patient complains of pressure by her kidneys and would like to get that checked    Hyperlipidemia Follow-Up      Are you regularly taking any medication or supplement to lower your cholesterol?   Yes- Pravastatin    Are you having muscle aches or other side effects that you think could be caused by your cholesterol lowering medication?  No    Hypothyroidism Follow-up      Since last visit, patient describes the following symptoms: Weight stable, no hair loss, no skin changes, no constipation, no loose stools      Review of Systems   Constitutional, HEENT, cardiovascular, pulmonary, gi and gu systems are negative, except as otherwise noted.      Objective    /80 (BP Location: Right arm, Patient Position: Chair, Cuff Size: Adult Large)   Pulse 97   Resp 16   Ht 1.524 m (5')   Wt 93.9 kg (207 lb)   SpO2 95%   BMI 40.43 kg/m    Body mass index is 40.43 kg/m .  Physical Exam   GENERAL: healthy, alert and no distress  NECK: no adenopathy, no asymmetry, masses, or scars and thyroid normal to palpation  RESP: lungs clear to auscultation - no rales, rhonchi or wheezes  CV: regular  rate and rhythm, normal S1 S2, no S3 or S4, no murmur, click or rub, no peripheral edema and peripheral pulses strong  ABDOMEN: soft, nontender, no hepatosplenomegaly, no masses and bowel sounds normal  MS: left foot pinky-erythema, swelling, tender at IP joint    Xray - independent read- slightly displaced fracture of the distal phalange of the left pinky toe        Assessment & Plan     Ervin was seen today for toe pain.    Diagnoses and all orders for this visit:    Closed displaced fracture of distal phalanx of lesser toe of left foot, initial encounter  -     XR Toe Left G/E 2 Views; Future  -     XR Foot Left G/E 3 Views; Future  -     Ankle/Foot Bracing Supplies Order    Visit for screening mammogram  -     MA SCREENING DIGITAL BILAT - Future  (s+30); Future    Hypothyroidism, unspecified type  -     TSH with free T4 reflex    Hyperlipidemia LDL goal <130    Obesity, morbid, BMI 40.0-49.9 (H)  -     Basic metabolic panel  (Ca, Cl, CO2, Creat, Gluc, K, Na, BUN)  -     Hemoglobin A1c       Wear shoe for 4 weeks   Ibuprofen 600 mg every 6 hours as needed for pain  Apply ice pack as needed   elevate  BMI:   Estimated body mass index is 40.43 kg/m  as calculated from the following:    Height as of this encounter: 1.524 m (5').    Weight as of this encounter: 93.9 kg (207 lb).   Weight management plan: Discussed healthy diet and exercise guidelines      Return in about 1 month (around 9/27/2020) for In-Clinic Visit.    Val Wright PA-C  [facility]

## 2020-08-27 NOTE — NURSING NOTE
Patient fitted for left post op shoe with DME form signed.    Patient was given but declined completing the PHQ9 questionnaire today.    Corey Stanley CMA

## 2020-08-27 NOTE — LETTER
September 21, 2020      Aziza Kuo Loco  [address]  [place] [address]        Dear ,    All blood work is normal.  Refills were sent to your pharmacy     Thank you,      Jonna Wright PAC         Results for orders placed or performed in visit on 08/27/20   XR Foot Left G/E 3 Views     Status: None    Narrative    LEFT FOOT THREE OR MORE VIEWS   8/27/2020 1:56 PM     HISTORY: Injury of toe on left foot, initial encounter.    COMPARISON: None.      Impression    IMPRESSION: The middle and distal phalanges of the small toe are  fused. There is a nondisplaced transverse fracture through this fused  distal phalanx of the fifth toe. Small plantar and posterior calcaneal  spurs.    MAVIS WATTS MD   Results for orders placed or performed in visit on 08/27/20   TSH with free T4 reflex     Status: None   Result Value Ref Range    TSH 1.05 0.40 - 4.00 mU/L   Basic metabolic panel  (Ca, Cl, CO2, Creat, Gluc, K, Na, BUN)     Status: None   Result Value Ref Range    Sodium 139 133 - 144 mmol/L    Potassium 4.4 3.4 - 5.3 mmol/L    Chloride 106 94 - 109 mmol/L    Carbon Dioxide 23 20 - 32 mmol/L    Anion Gap 10 3 - 14 mmol/L    Glucose 99 70 - 99 mg/dL    Urea Nitrogen 19 7 - 30 mg/dL    Creatinine 0.79 0.52 - 1.04 mg/dL    GFR Estimate 81 >60 mL/min/[1.73_m2]    GFR Estimate If Black >90 >60 mL/min/[1.73_m2]    Calcium 9.2 8.5 - 10.1 mg/dL   Hemoglobin A1c     Status: None   Result Value Ref Range    Hemoglobin A1C 5.6 0 - 5.6 %

## 2020-09-21 RX ORDER — LEVOTHYROXINE SODIUM 150 UG/1
150 TABLET ORAL DAILY
Qty: 90 TABLET | Refills: 3 | Status: SHIPPED | OUTPATIENT
Start: 2020-09-21 | End: 2021-10-11

## 2020-09-21 RX ORDER — PRAVASTATIN SODIUM 40 MG
40 TABLET ORAL DAILY
Qty: 90 TABLET | Refills: 3 | Status: SHIPPED | OUTPATIENT
Start: 2020-09-21 | End: 2021-08-19

## 2021-01-21 ENCOUNTER — OFFICE VISIT (OUTPATIENT)
Dept: FAMILY MEDICINE | Facility: CLINIC | Age: 61
End: 2021-01-21
Payer: COMMERCIAL

## 2021-01-21 VITALS
DIASTOLIC BLOOD PRESSURE: 69 MMHG | BODY MASS INDEX: 41.82 KG/M2 | WEIGHT: 213 LBS | TEMPERATURE: 98.1 F | OXYGEN SATURATION: 96 % | HEIGHT: 60 IN | HEART RATE: 84 BPM | SYSTOLIC BLOOD PRESSURE: 113 MMHG

## 2021-01-21 DIAGNOSIS — E03.9 HYPOTHYROIDISM, UNSPECIFIED TYPE: ICD-10-CM

## 2021-01-21 DIAGNOSIS — F43.21 ADJUSTMENT DISORDER WITH DEPRESSED MOOD: ICD-10-CM

## 2021-01-21 DIAGNOSIS — E55.9 VITAMIN D DEFICIENCY: ICD-10-CM

## 2021-01-21 DIAGNOSIS — E66.01 OBESITY, MORBID, BMI 40.0-49.9 (H): ICD-10-CM

## 2021-01-21 DIAGNOSIS — R51.9 INTRACTABLE EPISODIC HEADACHE, UNSPECIFIED HEADACHE TYPE: Primary | ICD-10-CM

## 2021-01-21 LAB
ALBUMIN SERPL-MCNC: 3.9 G/DL (ref 3.4–5)
ALP SERPL-CCNC: 95 U/L (ref 40–150)
ALT SERPL W P-5'-P-CCNC: 60 U/L (ref 0–50)
ANION GAP SERPL CALCULATED.3IONS-SCNC: 6 MMOL/L (ref 3–14)
AST SERPL W P-5'-P-CCNC: 27 U/L (ref 0–45)
BILIRUB SERPL-MCNC: 0.5 MG/DL (ref 0.2–1.3)
BUN SERPL-MCNC: 16 MG/DL (ref 7–30)
CALCIUM SERPL-MCNC: 9.4 MG/DL (ref 8.5–10.1)
CHLORIDE SERPL-SCNC: 106 MMOL/L (ref 94–109)
CO2 SERPL-SCNC: 28 MMOL/L (ref 20–32)
CREAT SERPL-MCNC: 0.75 MG/DL (ref 0.52–1.04)
ERYTHROCYTE [DISTWIDTH] IN BLOOD BY AUTOMATED COUNT: 14.4 % (ref 10–15)
GFR SERPL CREATININE-BSD FRML MDRD: 85 ML/MIN/{1.73_M2}
GLUCOSE SERPL-MCNC: 86 MG/DL (ref 70–99)
HCT VFR BLD AUTO: 46.1 % (ref 35–47)
HGB BLD-MCNC: 14.7 G/DL (ref 11.7–15.7)
MCH RBC QN AUTO: 26.5 PG (ref 26.5–33)
MCHC RBC AUTO-ENTMCNC: 31.9 G/DL (ref 31.5–36.5)
MCV RBC AUTO: 83 FL (ref 78–100)
PLATELET # BLD AUTO: 180 10E9/L (ref 150–450)
POTASSIUM SERPL-SCNC: 4.4 MMOL/L (ref 3.4–5.3)
PROT SERPL-MCNC: 7.5 G/DL (ref 6.8–8.8)
RBC # BLD AUTO: 5.55 10E12/L (ref 3.8–5.2)
SODIUM SERPL-SCNC: 140 MMOL/L (ref 133–144)
TSH SERPL DL<=0.005 MIU/L-ACNC: 2.55 MU/L (ref 0.4–4)
WBC # BLD AUTO: 7.1 10E9/L (ref 4–11)

## 2021-01-21 PROCEDURE — 99214 OFFICE O/P EST MOD 30 MIN: CPT | Performed by: PHYSICIAN ASSISTANT

## 2021-01-21 PROCEDURE — 36415 COLL VENOUS BLD VENIPUNCTURE: CPT | Performed by: PHYSICIAN ASSISTANT

## 2021-01-21 PROCEDURE — 85027 COMPLETE CBC AUTOMATED: CPT | Performed by: PHYSICIAN ASSISTANT

## 2021-01-21 PROCEDURE — 84443 ASSAY THYROID STIM HORMONE: CPT | Performed by: PHYSICIAN ASSISTANT

## 2021-01-21 PROCEDURE — 80053 COMPREHEN METABOLIC PANEL: CPT | Performed by: PHYSICIAN ASSISTANT

## 2021-01-21 PROCEDURE — 82306 VITAMIN D 25 HYDROXY: CPT | Performed by: PHYSICIAN ASSISTANT

## 2021-01-21 ASSESSMENT — PATIENT HEALTH QUESTIONNAIRE - PHQ9
SUM OF ALL RESPONSES TO PHQ QUESTIONS 1-9: 3
5. POOR APPETITE OR OVEREATING: SEVERAL DAYS

## 2021-01-21 ASSESSMENT — ANXIETY QUESTIONNAIRES
5. BEING SO RESTLESS THAT IT IS HARD TO SIT STILL: NOT AT ALL
1. FEELING NERVOUS, ANXIOUS, OR ON EDGE: NOT AT ALL
6. BECOMING EASILY ANNOYED OR IRRITABLE: SEVERAL DAYS
3. WORRYING TOO MUCH ABOUT DIFFERENT THINGS: SEVERAL DAYS
IF YOU CHECKED OFF ANY PROBLEMS ON THIS QUESTIONNAIRE, HOW DIFFICULT HAVE THESE PROBLEMS MADE IT FOR YOU TO DO YOUR WORK, TAKE CARE OF THINGS AT HOME, OR GET ALONG WITH OTHER PEOPLE: NOT DIFFICULT AT ALL
2. NOT BEING ABLE TO STOP OR CONTROL WORRYING: NEARLY EVERY DAY
GAD7 TOTAL SCORE: 9
7. FEELING AFRAID AS IF SOMETHING AWFUL MIGHT HAPPEN: NEARLY EVERY DAY

## 2021-01-21 ASSESSMENT — MIFFLIN-ST. JEOR: SCORE: 1447.66

## 2021-01-21 ASSESSMENT — PAIN SCALES - GENERAL: PAINLEVEL: MODERATE PAIN (5)
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
## 2021-01-22 LAB — DEPRECATED CALCIDIOL+CALCIFEROL SERPL-MC: 15 UG/L (ref 20–75)

## 2021-01-22 ASSESSMENT — ANXIETY QUESTIONNAIRES: GAD7 TOTAL SCORE: 9

## 2021-01-25 RX ORDER — ERGOCALCIFEROL 1.25 MG/1
50000 CAPSULE, LIQUID FILLED ORAL WEEKLY
Qty: 12 CAPSULE | Refills: 0 | Status: SHIPPED | OUTPATIENT
Start: 2021-01-25 | End: 2021-04-13

## 2021-08-09 ENCOUNTER — OFFICE VISIT (OUTPATIENT)
Dept: URGENT CARE | Facility: URGENT CARE | Age: 61
End: 2021-08-09
Payer: COMMERCIAL

## 2021-08-09 VITALS
HEART RATE: 72 BPM | WEIGHT: 208.6 LBS | BODY MASS INDEX: 40.74 KG/M2 | OXYGEN SATURATION: 95 % | TEMPERATURE: 98.1 F | SYSTOLIC BLOOD PRESSURE: 126 MMHG | DIASTOLIC BLOOD PRESSURE: 78 MMHG | RESPIRATION RATE: 16 BRPM

## 2021-08-09 DIAGNOSIS — R10.13 EPIGASTRIC PAIN: ICD-10-CM

## 2021-08-09 DIAGNOSIS — R53.83 OTHER FATIGUE: ICD-10-CM

## 2021-08-09 DIAGNOSIS — R10.11 ABDOMINAL PAIN, RIGHT UPPER QUADRANT: Primary | ICD-10-CM

## 2021-08-09 LAB
ALBUMIN SERPL-MCNC: 3.8 G/DL (ref 3.4–5)
ALP SERPL-CCNC: 93 U/L (ref 40–150)
ALT SERPL W P-5'-P-CCNC: 45 U/L (ref 0–50)
ANION GAP SERPL CALCULATED.3IONS-SCNC: 4 MMOL/L (ref 3–14)
AST SERPL W P-5'-P-CCNC: 25 U/L (ref 0–45)
BASOPHILS # BLD AUTO: 0 10E3/UL (ref 0–0.2)
BASOPHILS NFR BLD AUTO: 0 %
BILIRUB SERPL-MCNC: 0.6 MG/DL (ref 0.2–1.3)
BUN SERPL-MCNC: 15 MG/DL (ref 7–30)
CALCIUM SERPL-MCNC: 8.6 MG/DL (ref 8.5–10.1)
CHLORIDE BLD-SCNC: 110 MMOL/L (ref 94–109)
CO2 SERPL-SCNC: 26 MMOL/L (ref 20–32)
CREAT SERPL-MCNC: 0.71 MG/DL (ref 0.52–1.04)
EOSINOPHIL # BLD AUTO: 0.2 10E3/UL (ref 0–0.7)
EOSINOPHIL NFR BLD AUTO: 3 %
ERYTHROCYTE [DISTWIDTH] IN BLOOD BY AUTOMATED COUNT: 15 % (ref 10–15)
GFR SERPL CREATININE-BSD FRML MDRD: >90 ML/MIN/1.73M2
GLUCOSE BLD-MCNC: 90 MG/DL (ref 70–99)
HBA1C MFR BLD: 5.7 % (ref 0–5.6)
HCT VFR BLD AUTO: 45.8 % (ref 35–47)
HGB BLD-MCNC: 14.6 G/DL (ref 11.7–15.7)
IMM GRANULOCYTES # BLD: 0 10E3/UL
IMM GRANULOCYTES NFR BLD: 0 %
LYMPHOCYTES # BLD AUTO: 2 10E3/UL (ref 0.8–5.3)
LYMPHOCYTES NFR BLD AUTO: 30 %
MCH RBC QN AUTO: 26.3 PG (ref 26.5–33)
MCHC RBC AUTO-ENTMCNC: 31.9 G/DL (ref 31.5–36.5)
MCV RBC AUTO: 83 FL (ref 78–100)
MONOCYTES # BLD AUTO: 0.5 10E3/UL (ref 0–1.3)
MONOCYTES NFR BLD AUTO: 8 %
NEUTROPHILS # BLD AUTO: 4 10E3/UL (ref 1.6–8.3)
NEUTROPHILS NFR BLD AUTO: 59 %
PLATELET # BLD AUTO: 189 10E3/UL (ref 150–450)
POTASSIUM BLD-SCNC: 4.3 MMOL/L (ref 3.4–5.3)
PROT SERPL-MCNC: 7.3 G/DL (ref 6.8–8.8)
RBC # BLD AUTO: 5.55 10E6/UL (ref 3.8–5.2)
SODIUM SERPL-SCNC: 140 MMOL/L (ref 133–144)
VIT B12 SERPL-MCNC: 492 PG/ML (ref 193–986)
WBC # BLD AUTO: 6.8 10E3/UL (ref 4–11)

## 2021-08-09 PROCEDURE — 85025 COMPLETE CBC W/AUTO DIFF WBC: CPT | Performed by: NURSE PRACTITIONER

## 2021-08-09 PROCEDURE — 36415 COLL VENOUS BLD VENIPUNCTURE: CPT | Performed by: NURSE PRACTITIONER

## 2021-08-09 PROCEDURE — 80053 COMPREHEN METABOLIC PANEL: CPT | Performed by: NURSE PRACTITIONER

## 2021-08-09 PROCEDURE — 82306 VITAMIN D 25 HYDROXY: CPT | Performed by: NURSE PRACTITIONER

## 2021-08-09 PROCEDURE — 83036 HEMOGLOBIN GLYCOSYLATED A1C: CPT | Performed by: NURSE PRACTITIONER

## 2021-08-09 PROCEDURE — 82607 VITAMIN B-12: CPT | Performed by: NURSE PRACTITIONER

## 2021-08-09 PROCEDURE — 99214 OFFICE O/P EST MOD 30 MIN: CPT | Performed by: NURSE PRACTITIONER

## 2021-08-09 ASSESSMENT — ENCOUNTER SYMPTOMS
BACK PAIN: 1
DIARRHEA: 0
ABDOMINAL PAIN: 1
NAUSEA: 0
SHORTNESS OF BREATH: 0
FEVER: 0
FATIGUE: 1
VOMITING: 0
SORE THROAT: 0
RHINORRHEA: 0
COUGH: 0
HEADACHES: 0
CHILLS: 0

## 2021-08-09 ASSESSMENT — PAIN SCALES - GENERAL: PAINLEVEL: EXTREME PAIN (8)

## 2021-08-09 NOTE — PROGRESS NOTES
SUBJECTIVE:   Aziza Adan is a 60 year old female presenting with a chief complaint of   Chief Complaint   Patient presents with     Abdominal Pain     x2-3 weeks. Right upper abdominal pain into back. Abdominal pain moves around. Hx of h.pylori       She is an established patient of [facility].    Abdominal Pain    Location: RUQ, epigastric area   Radiation: back.    Pain character: aching,   Severity: 4 on a scale of 1-10.    Duration: 3 week(s)   Course of Illness: slowly progressive.  Exacerbated by: nothing  Relieved by: nothing.  Associated Symptoms: back pain, fatigue  Female : Not applicable  Surgical History: none  Has history of Helicobacter pylori and elevated liver enzymes, she has type 2 diabetes      Review of Systems   Constitutional: Positive for fatigue. Negative for chills and fever.   HENT: Negative for congestion, ear pain, rhinorrhea and sore throat.    Respiratory: Negative for cough and shortness of breath.    Gastrointestinal: Positive for abdominal pain. Negative for diarrhea, nausea and vomiting.   Musculoskeletal: Positive for back pain.   Neurological: Negative for headaches.   All other systems reviewed and are negative.      Past Medical History:   Diagnosis Date     Adjustment disorder with depressed mood      Hyperlipidemia LDL goal <130      Hypothyroidism (acquired)      Family History   Problem Relation Age of Onset     Diabetes Father      Heart Disease Brother         ???open heart surgery at age 40     Depression Daughter         committed suicide age     Breast Cancer No family hx of      Colorectal Cancer No family hx of      Glaucoma No family hx of      Macular Degeneration No family hx of      Current Outpatient Medications   Medication Sig Dispense Refill     levothyroxine (SYNTHROID/LEVOTHROID) 150 MCG tablet Take 1 tablet (150 mcg) by mouth daily 90 tablet 3     omeprazole (PRILOSEC) 20 MG DR capsule Take 1 capsule (20 mg) by mouth daily 30 capsule 0      metroNIDAZOLE (METROGEL) 0.75 % topical gel Apply topically 2 times daily (Patient not taking: Reported on 1/21/2021) 180 g 0     naproxen (NAPROSYN) 500 MG tablet Take 1 tablet (500 mg) by mouth 2 times daily as needed for moderate pain (take with food) Take with meals (Patient not taking: Reported on 1/21/2021) 720 tablet 0     omeprazole (PRILOSEC) 20 MG DR capsule Take 1 capsule (20 mg) by mouth daily (Patient not taking: Reported on 1/21/2021) 30 capsule 1     Polyethylene Glycol 400 (BLINK TEARS) 0.25 % GEL Apply 1-2 drops to eye 3 times daily as needed (dry eyes) (Patient not taking: Reported on 1/21/2021) 10 mL 11     pravastatin (PRAVACHOL) 40 MG tablet Take 1 tablet (40 mg) by mouth daily (Patient not taking: Reported on 8/9/2021) 90 tablet 3     Social History     Tobacco Use     Smoking status: Never Smoker     Smokeless tobacco: Never Used   Substance Use Topics     Alcohol use: No     Alcohol/week: 0.0 standard drinks       OBJECTIVE  /78   Pulse 72   Temp 98.1  F (36.7  C) (Tympanic)   Resp 16   Wt 94.6 kg (208 lb 9.6 oz)   SpO2 95%   BMI 40.74 kg/m      Physical Exam  Vitals and nursing note reviewed.   Constitutional:       General: She is not in acute distress.     Appearance: She is well-developed. She is not diaphoretic.   HENT:      Head: Normocephalic and atraumatic.      Right Ear: Tympanic membrane and external ear normal.      Left Ear: Tympanic membrane and external ear normal.   Eyes:      Pupils: Pupils are equal, round, and reactive to light.   Pulmonary:      Effort: Pulmonary effort is normal. No respiratory distress.      Breath sounds: Normal breath sounds.   Musculoskeletal:      Cervical back: Normal range of motion and neck supple.   Lymphadenopathy:      Cervical: No cervical adenopathy.   Skin:     General: Skin is warm and dry.   Neurological:      General: No focal deficit present.      Mental Status: She is alert.      Cranial Nerves: No cranial nerve deficit.    Psychiatric:         Mood and Affect: Mood normal.         Labs:  Results for orders placed or performed in visit on 08/09/21 (from the past 24 hour(s))   CBC with platelets and differential    Narrative    The following orders were created for panel order CBC with platelets and differential.  Procedure                               Abnormality         Status                     ---------                               -----------         ------                     CBC with platelets and d...[[id number]]  Abnormal            Final result                 Please view results for these tests on the individual orders.   Hemoglobin A1c   Result Value Ref Range    Hemoglobin A1C 5.7 (H) 0.0 - 5.6 %   CBC with platelets and differential   Result Value Ref Range    WBC Count 6.8 4.0 - 11.0 10e3/uL    RBC Count 5.55 (H) 3.80 - 5.20 10e6/uL    Hemoglobin 14.6 11.7 - 15.7 g/dL    Hematocrit 45.8 35.0 - 47.0 %    MCV 83 78 - 100 fL    MCH 26.3 (L) 26.5 - 33.0 pg    MCHC 31.9 31.5 - 36.5 g/dL    RDW 15.0 10.0 - 15.0 %    Platelet Count 189 150 - 450 10e3/uL    % Neutrophils 59 %    % Lymphocytes 30 %    % Monocytes 8 %    % Eosinophils 3 %    % Basophils 0 %    % Immature Granulocytes 0 %    Absolute Neutrophils 4.0 1.6 - 8.3 10e3/uL    Absolute Lymphocytes 2.0 0.8 - 5.3 10e3/uL    Absolute Monocytes 0.5 0.0 - 1.3 10e3/uL    Absolute Eosinophils 0.2 0.0 - 0.7 10e3/uL    Absolute Basophils 0.0 0.0 - 0.2 10e3/uL    Absolute Immature Granulocytes 0.0 <=0.0 10e3/uL     ASSESSMENT:      ICD-10-CM    1. Abdominal pain, right upper quadrant  R10.11 Comprehensive metabolic panel (BMP + Alb, Alk Phos, ALT, AST, Total. Bili, TP)     Helicobacter pylori Antigen Stool     omeprazole (PRILOSEC) 20 MG DR capsule   2. Epigastric pain  R10.13 omeprazole (PRILOSEC) 20 MG DR capsule   3. Other fatigue  R53.83 Vitamin D Deficiency     Vitamin B12     CBC with platelets and differential     Hemoglobin A1c          PLAN:  I discussed lab results  with the patient.  Patient educational/instructional material provided including reasons for follow-up    The patient indicates understanding of these issues and agrees with the plan.        Patient Instructions     Patient Education     Epigastric Pain (Uncertain Cause)  Epigastric pain is pain in the upper abdomen. It can be a sign of disease. Common causes include:     Acid reflux (stomach acid flowing up into the esophagus)    Gastritis (irritation of the stomach lining) Most often this is from aspirin or NSAID medicines such as ibuprofen, bacteria called H. pylori, or frequent alcohol use.    Peptic ulcer disease    Inflammation of the pancreas    Gallstone    Infection in the gallbladder  Pain may be dull or burning. It may spread upward to the chest or to the back. There may be other symptoms such as belching, bloating, cramps or hunger pains. There may be weight loss or poor appetite, nausea or vomiting.   Since the cause of your pain is not certain yet, you may need more tests. Sometimes the doctor will treat you for the most likely condition to see if there is improvement before doing more tests.     Home care  Medicines    Antacids help neutralize the normal acids in your stomach. If you don t like the liquid, you can try a chewable one. You may find one works better than another for you. Overuse can cause diarrhea or constipation. Call your provider if you have questions about your medicines or concerns about side effects.    Acid blockers (H2 blockers) decrease acid production. Examples are cimetidine and famotidine.    Acid inhibitors (PPIs) decrease acid production in a different way than the blockers. You may find they work better, but can take a little longer to take effect.  Examples are omeprazole, lansoprazole, pantoprazole, rabeprazole, and esomeprazole. Many of these are available over-the-counter or available as generics.    Take an antacid 30 to 60 minutes after eating and at bedtime, but not  at the same time as an acid blocker.    Try not to take NSAIDs such as ibuprofen. Aspirin may also cause problems, but if taking it for your heart or other medical reasons, talk to your doctor before stopping it.  Diet    If certain foods seem to cause your pain, try not to eat them. Certain foods can worsen symptoms of gastritis. Limit or avoid fatty, fried, and spicy foods, as well as coffee, chocolate, mint, and foods with high acid content such as tomatoes and citrus fruit and juices (orange, grapefruit, lemon).    Eat slowly and chew food well before swallowing. Symptoms of gastritis can be worsened by certain foods.    Don't drink alcohol. It can irritate the stomach. If you have trouble giving up alcohol, ask your doctor for treatment resources.    Don't consume caffeine, or use tobacco. These can delay healing and worsen your problem.    Try eating smaller meals with snacks in between. Don't eat large meals before bedtime.    Keep an empty stomach for 2 to 3 hours before lying down.    Prop the head of the bed up if you have overnight symptoms. This helps acid clear from your esophagus.    Follow-up care  Follow up with your healthcare provider or as advised.  When to seek medical advice  Call your healthcare provider right away if any of the following occur:    Stomach pain worsens or moves to the right lower part of the abdomen    Chest pain appears, or if it worsens or spreads to the chest, back, neck, shoulder, or arm    Frequent vomiting (can t keep down liquids)    Blood in the stool or vomit (red or black color)    Feeling weak or dizzy, fainting, or having trouble breathing    Fever of 100.4 F (38 C) or higher, or as directed by your healthcare provider    Abdominal swelling    Worsening symptoms or new symptoms  [employer] last reviewed this educational content on 5/1/2020 2000-2021 [employer]. All rights reserved. This information is not intended as a substitute for professional  medical care. Always follow your healthcare professional's instructions.           Patient Education     Unknown Causes of Abdominal Pain (Female)    The exact cause of your belly (abdominal) pain is not clear. This does not mean that this is something to worry about. Everyone likes to know the exact cause of the problem. But sometimes with belly pain, there is no clear-cut cause, and this could be a good thing. The good news is that your symptoms can be treated, and you will feel better.   Your condition does not seem serious now. But sometimes the signs of a serious problem may take more time to appear. For this reason, it is important for you to watch for any new symptoms, problems, or worsening of your condition.  Over the next few days, the abdominal pain may come and go. Or it may be constant. Other common symptoms can include nausea and vomiting. Sometimes it can be difficult to tell if you feel nauseous. You may just feel bad and not connect that feeling to nausea. Constipation, diarrhea, and a fever may go along with the pain.  The pain may continue even if treated correctly over the following days. Depending on how things go, sometimes the cause can become clear and may need more or different treatment. Additional evaluations, medicines, or tests may also be needed.  Home care  Your healthcare provider may prescribe medicine for pain, symptoms, or an infection.  Follow the healthcare provider's instructions for taking these medicines.  General care    Rest as much as you can until your next exam. No strenuous activities.    Try to find positions that ease discomfort. A small pillow placed on the abdomen may help relieve pain.    Something warm on your abdomen (such as a heating pad) may help, but be careful not to burn yourself.  Diet    Don t force yourself to eat, especially if having cramps, vomiting, or diarrhea.    Water is important so you don't get dehydrated. Soup may also be good. Sports drinks  may also help, especially if they are not too acidic. Don't drink sugary drinks as this can make things worse. Take liquids in small amounts. Don t guzzle them.    Caffeine sometimes makes the pain and cramping worse.    Don t take dairy products if you have vomiting or diarrhea.    Don't eat large amounts at a time. Wait a few minutes between bites.    Eat a diet low in fiber (called a low-residue diet). Foods allowed include refined breads, white rice, fruit and vegetable juices without pulp, tender meats. These foods will pass more easily through the intestine.    Don t have whole-grain foods, whole fruits and vegetables, meats, seeds and nuts, fried or fatty foods, dairy, alcohol and spicy foods until your symptoms go away.  Follow-up care  Follow up with your healthcare provider, or as advised, if your pain does not begin to improve in the next 24 hours.  Call 911  Call 911 if any of these occur:    Trouble breathing    Confusion    Fainting or loss of consciousness    Rapid heart rate    Seizure  When to seek medical advice  Call your healthcare provider right away if any of these occur:    Pain gets worse or moves to the right lower abdomen    New or worsening vomiting or diarrhea    Swelling of the abdomen    Unable to pass stool for more than 3 days    Fever of 100.4 F (38 C) or higher, or as directed by your healthcare provider.    Blood in vomit or bowel movements (dark red or black color)    Yellow color of eyes and skin (jaundice)    Weakness, dizziness    Chest, arm, back, neck, or jaw pain    Unexpected vaginal bleeding or missed period    Can't keep down liquids or water and you are getting dehydrated  [employer] last reviewed this educational content on 6/1/2018 2000-2021 [employer]. All rights reserved. This information is not intended as a substitute for professional medical care. Always follow your healthcare professional's instructions.

## 2021-08-09 NOTE — PATIENT INSTRUCTIONS
Patient Education     Epigastric Pain (Uncertain Cause)  Epigastric pain is pain in the upper abdomen. It can be a sign of disease. Common causes include:     Acid reflux (stomach acid flowing up into the esophagus)    Gastritis (irritation of the stomach lining) Most often this is from aspirin or NSAID medicines such as ibuprofen, bacteria called H. pylori, or frequent alcohol use.    Peptic ulcer disease    Inflammation of the pancreas    Gallstone    Infection in the gallbladder  Pain may be dull or burning. It may spread upward to the chest or to the back. There may be other symptoms such as belching, bloating, cramps or hunger pains. There may be weight loss or poor appetite, nausea or vomiting.   Since the cause of your pain is not certain yet, you may need more tests. Sometimes the doctor will treat you for the most likely condition to see if there is improvement before doing more tests.     Home care  Medicines    Antacids help neutralize the normal acids in your stomach. If you don t like the liquid, you can try a chewable one. You may find one works better than another for you. Overuse can cause diarrhea or constipation. Call your provider if you have questions about your medicines or concerns about side effects.    Acid blockers (H2 blockers) decrease acid production. Examples are cimetidine and famotidine.    Acid inhibitors (PPIs) decrease acid production in a different way than the blockers. You may find they work better, but can take a little longer to take effect.  Examples are omeprazole, lansoprazole, pantoprazole, rabeprazole, and esomeprazole. Many of these are available over-the-counter or available as generics.    Take an antacid 30 to 60 minutes after eating and at bedtime, but not at the same time as an acid blocker.    Try not to take NSAIDs such as ibuprofen. Aspirin may also cause problems, but if taking it for your heart or other medical reasons, talk to your doctor before stopping  it.  Diet    If certain foods seem to cause your pain, try not to eat them. Certain foods can worsen symptoms of gastritis. Limit or avoid fatty, fried, and spicy foods, as well as coffee, chocolate, mint, and foods with high acid content such as tomatoes and citrus fruit and juices (orange, grapefruit, lemon).    Eat slowly and chew food well before swallowing. Symptoms of gastritis can be worsened by certain foods.    Don't drink alcohol. It can irritate the stomach. If you have trouble giving up alcohol, ask your doctor for treatment resources.    Don't consume caffeine, or use tobacco. These can delay healing and worsen your problem.    Try eating smaller meals with snacks in between. Don't eat large meals before bedtime.    Keep an empty stomach for 2 to 3 hours before lying down.    Prop the head of the bed up if you have overnight symptoms. This helps acid clear from your esophagus.    Follow-up care  Follow up with your healthcare provider or as advised.  When to seek medical advice  Call your healthcare provider right away if any of the following occur:    Stomach pain worsens or moves to the right lower part of the abdomen    Chest pain appears, or if it worsens or spreads to the chest, back, neck, shoulder, or arm    Frequent vomiting (can t keep down liquids)    Blood in the stool or vomit (red or black color)    Feeling weak or dizzy, fainting, or having trouble breathing    Fever of 100.4 F (38 C) or higher, or as directed by your healthcare provider    Abdominal swelling    Worsening symptoms or new symptoms  [employer] last reviewed this educational content on 5/1/2020 2000-2021 [employer]. All rights reserved. This information is not intended as a substitute for professional medical care. Always follow your healthcare professional's instructions.           Patient Education     Unknown Causes of Abdominal Pain (Female)    The exact cause of your belly (abdominal) pain is not clear.  This does not mean that this is something to worry about. Everyone likes to know the exact cause of the problem. But sometimes with belly pain, there is no clear-cut cause, and this could be a good thing. The good news is that your symptoms can be treated, and you will feel better.   Your condition does not seem serious now. But sometimes the signs of a serious problem may take more time to appear. For this reason, it is important for you to watch for any new symptoms, problems, or worsening of your condition.  Over the next few days, the abdominal pain may come and go. Or it may be constant. Other common symptoms can include nausea and vomiting. Sometimes it can be difficult to tell if you feel nauseous. You may just feel bad and not connect that feeling to nausea. Constipation, diarrhea, and a fever may go along with the pain.  The pain may continue even if treated correctly over the following days. Depending on how things go, sometimes the cause can become clear and may need more or different treatment. Additional evaluations, medicines, or tests may also be needed.  Home care  Your healthcare provider may prescribe medicine for pain, symptoms, or an infection.  Follow the healthcare provider's instructions for taking these medicines.  General care    Rest as much as you can until your next exam. No strenuous activities.    Try to find positions that ease discomfort. A small pillow placed on the abdomen may help relieve pain.    Something warm on your abdomen (such as a heating pad) may help, but be careful not to burn yourself.  Diet    Don t force yourself to eat, especially if having cramps, vomiting, or diarrhea.    Water is important so you don't get dehydrated. Soup may also be good. Sports drinks may also help, especially if they are not too acidic. Don't drink sugary drinks as this can make things worse. Take liquids in small amounts. Don t guzzle them.    Caffeine sometimes makes the pain and cramping  worse.    Don t take dairy products if you have vomiting or diarrhea.    Don't eat large amounts at a time. Wait a few minutes between bites.    Eat a diet low in fiber (called a low-residue diet). Foods allowed include refined breads, white rice, fruit and vegetable juices without pulp, tender meats. These foods will pass more easily through the intestine.    Don t have whole-grain foods, whole fruits and vegetables, meats, seeds and nuts, fried or fatty foods, dairy, alcohol and spicy foods until your symptoms go away.  Follow-up care  Follow up with your healthcare provider, or as advised, if your pain does not begin to improve in the next 24 hours.  Call 911  Call 911 if any of these occur:    Trouble breathing    Confusion    Fainting or loss of consciousness    Rapid heart rate    Seizure  When to seek medical advice  Call your healthcare provider right away if any of these occur:    Pain gets worse or moves to the right lower abdomen    New or worsening vomiting or diarrhea    Swelling of the abdomen    Unable to pass stool for more than 3 days    Fever of 100.4 F (38 C) or higher, or as directed by your healthcare provider.    Blood in vomit or bowel movements (dark red or black color)    Yellow color of eyes and skin (jaundice)    Weakness, dizziness    Chest, arm, back, neck, or jaw pain    Unexpected vaginal bleeding or missed period    Can't keep down liquids or water and you are getting dehydrated  [employer] last reviewed this educational content on 6/1/2018 2000-2021 [employer]. All rights reserved. This information is not intended as a substitute for professional medical care. Always follow your healthcare professional's instructions.

## 2021-08-09 NOTE — LETTER
August 11, 2021      Aziza Kuo Loco  [address] [place] [address]        Dear ,    We are writing to inform you of your test results.    Your test results fall within the expected range(s). Your vitamin B12 level was normal.     Enclosed is a copy of these results.    Resulted Orders   Comprehensive metabolic panel (BMP + Alb, Alk Phos, ALT, AST, Total. Bili, TP)   Result Value Ref Range    Sodium 140 133 - 144 mmol/L    Potassium 4.3 3.4 - 5.3 mmol/L    Chloride 110 (H) 94 - 109 mmol/L    Carbon Dioxide (CO2) 26 20 - 32 mmol/L    Anion Gap 4 3 - 14 mmol/L    Urea Nitrogen 15 7 - 30 mg/dL    Creatinine 0.71 0.52 - 1.04 mg/dL    Calcium 8.6 8.5 - 10.1 mg/dL    Glucose 90 70 - 99 mg/dL    Alkaline Phosphatase 93 40 - 150 U/L    AST 25 0 - 45 U/L    ALT 45 0 - 50 U/L    Protein Total 7.3 6.8 - 8.8 g/dL    Albumin 3.8 3.4 - 5.0 g/dL    Bilirubin Total 0.6 0.2 - 1.3 mg/dL    GFR Estimate >90 >60 mL/min/1.73m2      Comment:      As of July 11, 2021, eGFR is calculated by the CKD-EPI creatinine equation, without race adjustment. eGFR can be influenced by muscle mass, exercise, and diet. The reported eGFR is an estimation only and is only applicable if the renal function is stable.   Vitamin B12   Result Value Ref Range    Vitamin B12 492 193 - 986 pg/mL   Hemoglobin A1c   Result Value Ref Range    Hemoglobin A1C 5.7 (H) 0.0 - 5.6 %      Comment:      Normal <5.7%   Prediabetes 5.7-6.4%    Diabetes 6.5% or higher     Note: Adopted from ADA consensus guidelines.   CBC with platelets and differential   Result Value Ref Range    WBC Count 6.8 4.0 - 11.0 10e3/uL    RBC Count 5.55 (H) 3.80 - 5.20 10e6/uL    Hemoglobin 14.6 11.7 - 15.7 g/dL    Hematocrit 45.8 35.0 - 47.0 %    MCV 83 78 - 100 fL    MCH 26.3 (L) 26.5 - 33.0 pg    MCHC 31.9 31.5 - 36.5 g/dL    RDW 15.0 10.0 - 15.0 %    Platelet Count 189 150 - 450 10e3/uL    % Neutrophils 59 %    % Lymphocytes 30 %    % Monocytes 8 %    % Eosinophils 3 %     % Basophils 0 %    % Immature Granulocytes 0 %    Absolute Neutrophils 4.0 1.6 - 8.3 10e3/uL    Absolute Lymphocytes 2.0 0.8 - 5.3 10e3/uL    Absolute Monocytes 0.5 0.0 - 1.3 10e3/uL    Absolute Eosinophils 0.2 0.0 - 0.7 10e3/uL    Absolute Basophils 0.0 0.0 - 0.2 10e3/uL    Absolute Immature Granulocytes 0.0 <=0.0 10e3/uL       If you have any questions or concerns, please call the clinic at the number listed above.   Thank you for choosing [facility].      Sincerely,      KRIS Washington CNP

## 2021-08-10 ENCOUNTER — TELEPHONE (OUTPATIENT)
Dept: URGENT CARE | Facility: URGENT CARE | Age: 61
End: 2021-08-10

## 2021-08-10 LAB — DEPRECATED CALCIDIOL+CALCIFEROL SERPL-MC: 19 UG/L (ref 20–75)

## 2021-08-10 NOTE — TELEPHONE ENCOUNTER
Patient called to clinic to ask about her lab results from visit she has with  provider yesterday.  Patient noted the provider said she would call her with the results but patient notes she never got a call.    Left clinic after blood draw yesterday so no results were discussed with her.    Review of chart shows multiple tests ordered on 8/9/21. Able to view provider note regarding normal Vitamin B12 level, relayed this to patient.  Patient is asking for all other test results.    Routing to  provider team, can you please review all lab tests done on 8/9/21 and comment on results.     Please route encounter to appropriate team to relay results for Urgent Care department, do not send back to writer.  Thank you.      Bernie Solano RN  [facility]

## 2021-08-11 NOTE — TELEPHONE ENCOUNTER
Yolande Jay, KRIS CNP  You 14 hours ago (4:35 PM)   EM  I just called patient and discussed all the results received   Yolande Jay  DNP, FNP-BC   Family Nurse Practitoner

## 2021-08-13 ENCOUNTER — APPOINTMENT (OUTPATIENT)
Dept: LAB | Facility: CLINIC | Age: 61
End: 2021-08-13
Payer: COMMERCIAL

## 2021-08-13 ENCOUNTER — IMMUNIZATION (OUTPATIENT)
Dept: NURSING | Facility: CLINIC | Age: 61
End: 2021-08-13
Payer: COMMERCIAL

## 2021-08-13 PROCEDURE — 0001A PR COVID VAC PFIZER DIL RECON 30 MCG/0.3 ML IM: CPT

## 2021-08-13 PROCEDURE — 87338 HPYLORI STOOL AG IA: CPT | Performed by: NURSE PRACTITIONER

## 2021-08-13 PROCEDURE — 91300 PR COVID VAC PFIZER DIL RECON 30 MCG/0.3 ML IM: CPT

## 2021-08-17 DIAGNOSIS — A04.8 HELICOBACTER PYLORI (H. PYLORI) INFECTION: Primary | ICD-10-CM

## 2021-08-17 LAB — H PYLORI AG STL QL IA: POSITIVE

## 2021-08-18 ENCOUNTER — TELEPHONE (OUTPATIENT)
Dept: NURSING | Facility: CLINIC | Age: 61
End: 2021-08-18

## 2021-08-18 NOTE — PROGRESS NOTES
I have discussed the results with patient and her medications have been send to pharmacy  Yolande Jay DNP, FNP-BC  Family Nurse Practitoner

## 2021-08-19 ENCOUNTER — TELEPHONE (OUTPATIENT)
Dept: NURSING | Facility: CLINIC | Age: 61
End: 2021-08-19

## 2021-08-19 ENCOUNTER — TELEPHONE (OUTPATIENT)
Dept: FAMILY MEDICINE | Facility: CLINIC | Age: 61
End: 2021-08-19

## 2021-08-19 DIAGNOSIS — R10.13 EPIGASTRIC PAIN: ICD-10-CM

## 2021-08-19 DIAGNOSIS — A04.8 H. PYLORI INFECTION: Primary | ICD-10-CM

## 2021-08-19 DIAGNOSIS — R10.11 ABDOMINAL PAIN, RIGHT UPPER QUADRANT: ICD-10-CM

## 2021-08-19 RX ORDER — CLARITHROMYCIN 500 MG
500 TABLET ORAL 2 TIMES DAILY
Qty: 14 TABLET | Refills: 0 | Status: SHIPPED | OUTPATIENT
Start: 2021-08-19 | End: 2021-08-26

## 2021-08-19 RX ORDER — AMOXICILLIN 500 MG/1
1000 CAPSULE ORAL 2 TIMES DAILY
Qty: 28 CAPSULE | Refills: 0 | Status: SHIPPED | OUTPATIENT
Start: 2021-08-19 | End: 2021-08-26

## 2021-08-19 NOTE — TELEPHONE ENCOUNTER
Telephone Call:    Pharmacy called needing further direction. Prevpac is not covered by insurance.  They are planning to prescribe  Medications separately (lansoprazole, Amoxicillin,Clarithromycin.)    Lansoprazole is only covered for one dose per day by insurance.      Pharmacy wants to know if:  A.  patient should buy over the counter.  B. a prior authorization can be completed.    C. Patient should take one dose of Lansoprazole a day.    Routing to [facility] for direction.    Shreya Keen RN   [facility] Nurse Advisor  11:17 AM 8/19/2021

## 2021-08-19 NOTE — TELEPHONE ENCOUNTER
Rx for the 3 separate meds were sent to her pharmacy.  Please notify patient.    Ashley Alvarado PA-C

## 2021-08-20 NOTE — TELEPHONE ENCOUNTER
Calling for status of message. Advised prescription's sent last night at 5:12 pm.  Bing PUENTEN, RN

## 2021-11-03 NOTE — PATIENT INSTRUCTIONS
At [facility], we strive to deliver an exceptional experience to you, every time we see you. If you receive a survey, please complete it as we do value your feedback.  If you have MyChart, you can expect to receive results automatically within 24 hours of their completion.  Your provider will send a note interpreting your results as well.   If you do not have MyChart, you should receive your results in about a week by mail.    Your care team:                            Family Medicine Internal Medicine   MD Brian Hylton MD Shantel Branch-Fleming, MD Srinivasa Vaka, MD Katya Belousova, PASHARRI Boyer, APRN CNP    Dean Fowler, MD Pediatrics   Jus Jennings, PASHARRI Emanuel, CNP MD Kelly Arredondo APRN CNP   MD Diane Jovel MD Deborah Mielke, MD Haley Han, APRN [facility]      Clinic hours: Monday - Thursday 7 am-6 pm; Fridays 7 am-5 pm.   Urgent care: Monday - Friday 10 am- 8 pm; Saturday and Sunday 9 am-5 pm.    Clinic: [phone]       [place] [facility]: Monday - Thursday 8 am - 7 pm; Friday 8 am - 6 pm  [facility]: [phone]     Use [email or web address] for 24/7 diagnosis and treatment of dozens of conditions.    
Implemented All Universal Safety Interventions:  [place] to call system. Call bell, personal items and telephone within reach. Instruct patient to call for assistance. Room bathroom lighting operational. Non-slip footwear when patient is off stretcher. Physically safe environment: no spills, clutter or unnecessary equipment. Stretcher in lowest position, wheels locked, appropriate side rails in place.

## 2021-11-03 NOTE — PROGRESS NOTES
Assessment & Plan   Problem List Items Addressed This Visit        Endocrine    Hypothyroidism, unspecified hypothyroidism type - Primary    Relevant Medications    levothyroxine (SYNTHROID/LEVOTHROID) 150 MCG tablet    Other Relevant Orders    TSH with free T4 reflex      Other Visit Diagnoses     Screening for hyperlipidemia        Relevant Orders    Lipid panel reflex to direct LDL Non-fasting    Encounter for screening mammogram for breast cancer        Relevant Orders    MA SCREENING DIGITAL BILAT - Future  (s+30)         Thyroid conditions is stable   Continue current medications   Follow up in 6-12 months     20 minutes spent on the date of the encounter doing chart review, history and exam, documentation and further activities per the note       BMI:   Estimated body mass index is 40.62 kg/m  as calculated from the following:    Height as of 1/21/21: 1.524 m (5').    Weight as of this encounter: 94.3 kg (208 lb).   Weight management plan: Discussed healthy diet and exercise guidelines        Return in about 1 year (around 11/4/2022).    Aliya Cisse PA-C  [facility] is a 61 year old who presents for the following health issues     HPI     Hypothyroidism Follow-up      Since last visit, patient describes the following symptoms: Weight stable, no hair loss, no skin changes, no constipation, no loose stools      How many servings of fruits and vegetables do you eat daily?  2-3    On average, how many sweetened beverages do you drink each day (Examples: soda, juice, sweet tea, etc.  Do NOT count diet or artificially sweetened beverages)?   1    How many days per week do you exercise enough to make your heart beat faster? 3 or less    How many minutes a day do you exercise enough to make your heart beat faster? 9 or less    How many days per week do you miss taking your medication? 0        Review of Systems   Constitutional, HEENT, cardiovascular,  pulmonary, gi and gu systems are negative, except as otherwise noted.      Objective    /76 (BP Location: Left arm, Patient Position: Chair, Cuff Size: Adult Regular)   Pulse 74   Temp 98.2  F (36.8  C) (Tympanic)   Wt 94.3 kg (208 lb)   SpO2 98%   Breastfeeding No   BMI 40.62 kg/m    Body mass index is 40.62 kg/m .  Physical Exam   GENERAL: healthy, alert and no distress  NECK: no adenopathy, no asymmetry, masses, or scars and thyroid normal to palpation  RESP: lungs clear to auscultation - no rales, rhonchi or wheezes  CV: regular rate and rhythm, normal S1 S2, no S3 or S4, no murmur, click or rub, no peripheral edema and peripheral pulses strong  ABDOMEN: soft, nontender, no hepatosplenomegaly, no masses and bowel sounds normal  MS: no gross musculoskeletal defects noted, no edema  NEURO: Normal strength and tone, mentation intact and speech normal  PSYCH: mentation appears normal, affect normal/bright

## 2021-11-04 ENCOUNTER — OFFICE VISIT (OUTPATIENT)
Dept: FAMILY MEDICINE | Facility: CLINIC | Age: 61
End: 2021-11-04
Payer: COMMERCIAL

## 2021-11-04 VITALS
SYSTOLIC BLOOD PRESSURE: 124 MMHG | OXYGEN SATURATION: 98 % | WEIGHT: 208 LBS | BODY MASS INDEX: 40.62 KG/M2 | HEART RATE: 74 BPM | TEMPERATURE: 98.2 F | DIASTOLIC BLOOD PRESSURE: 76 MMHG

## 2021-11-04 DIAGNOSIS — Z12.31 ENCOUNTER FOR SCREENING MAMMOGRAM FOR BREAST CANCER: ICD-10-CM

## 2021-11-04 DIAGNOSIS — E03.9 ACQUIRED HYPOTHYROIDISM: Primary | ICD-10-CM

## 2021-11-04 DIAGNOSIS — Z13.220 SCREENING FOR HYPERLIPIDEMIA: ICD-10-CM

## 2021-11-04 LAB
CHOLEST SERPL-MCNC: 245 MG/DL
FASTING STATUS PATIENT QL REPORTED: NO
HDLC SERPL-MCNC: 39 MG/DL
LDLC SERPL CALC-MCNC: 170 MG/DL
NONHDLC SERPL-MCNC: 206 MG/DL
TRIGL SERPL-MCNC: 178 MG/DL
TSH SERPL DL<=0.005 MIU/L-ACNC: 0.45 MU/L (ref 0.4–4)

## 2021-11-04 PROCEDURE — 91300 COVID-19,PF,PFIZER (12+ YRS): CPT | Performed by: PHYSICIAN ASSISTANT

## 2021-11-04 PROCEDURE — 80061 LIPID PANEL: CPT | Performed by: PHYSICIAN ASSISTANT

## 2021-11-04 PROCEDURE — 84443 ASSAY THYROID STIM HORMONE: CPT | Performed by: PHYSICIAN ASSISTANT

## 2021-11-04 PROCEDURE — 36415 COLL VENOUS BLD VENIPUNCTURE: CPT | Performed by: PHYSICIAN ASSISTANT

## 2021-11-04 PROCEDURE — 99213 OFFICE O/P EST LOW 20 MIN: CPT | Mod: 25 | Performed by: PHYSICIAN ASSISTANT

## 2021-11-04 PROCEDURE — 0002A COVID-19,PF,PFIZER (12+ YRS): CPT | Performed by: PHYSICIAN ASSISTANT

## 2021-11-04 RX ORDER — LEVOTHYROXINE SODIUM 150 UG/1
150 TABLET ORAL DAILY
Qty: 90 TABLET | Refills: 3 | Status: SHIPPED | OUTPATIENT
Start: 2021-11-04 | End: 2022-11-29

## 2021-11-04 ASSESSMENT — PAIN SCALES - GENERAL: PAINLEVEL: NO PAIN (0)

## 2021-11-04 NOTE — LETTER
November 15, 2021      Aziza Adan  [address] [facility] [address]        Dear Aziza,     Your thyroids is within normal limits. Lipid is elevated. I will send a lower dose prescription for cholesterol medications to your pharmacy. Please restart it.       Sincerely     Aliya Cisse PA-C       Resulted Orders   Lipid panel reflex to direct LDL Non-fasting   Result Value Ref Range    Cholesterol 245 (H) <200 mg/dL    Triglycerides 178 (H) <150 mg/dL    Direct Measure HDL 39 (L) >=50 mg/dL    LDL Cholesterol Calculated 170 (H) <=100 mg/dL    Non HDL Cholesterol 206 (H) <130 mg/dL    Patient Fasting > 8hrs? No     Narrative    Cholesterol  Desirable:  <200 mg/dL    Triglycerides  Normal:  Less than 150 mg/dL  Borderline High:  150-199 mg/dL  High:  200-499 mg/dL  Very High:  Greater than or equal to 500 mg/dL    Direct Measure HDL  Female:  Greater than or equal to 50 mg/dL   Male:  Greater than or equal to 40 mg/dL    LDL Cholesterol  Desirable:  <100mg/dL  Above Desirable:  100-129 mg/dL   Borderline High:  130-159 mg/dL   High:  160-189 mg/dL   Very High:  >= 190 mg/dL    Non HDL Cholesterol  Desirable:  130 mg/dL  Above Desirable:  130-159 mg/dL  Borderline High:  160-189 mg/dL  High:  190-219 mg/dL  Very High:  Greater than or equal to 220 mg/dL   TSH with free T4 reflex   Result Value Ref Range    TSH 0.45 0.40 - 4.00 mU/L

## 2022-05-31 ENCOUNTER — TELEPHONE (OUTPATIENT)
Dept: FAMILY MEDICINE | Facility: CLINIC | Age: 62
End: 2022-05-31
Payer: COMMERCIAL

## 2022-05-31 NOTE — LETTER
[address] [address]  [phone]  Dept: [phone]    May 31, 2022    Aziza Adan  [address] [place] [address]    Dear Aziza Adan,     At [facility] we care about your health and are committed to providing quality patient care.    Which includes staying current on preventive cancer screenings.  You can increase your chances of finding and treating cancers through regular screenings.      Our records indicate you may be due for the following preventive screening(s):    Colonoscopy    Colonoscopy is recommended every ten years for everyone age 50 and older. Please take a moment to read over the enclosed information packet about colon cancer screening. We strongly urge our patient's to consider having a colonoscopy done, which is the best screening test available and only needs to be done every 10 years if normal. If you are unwilling or unable to have a colonoscopy then we recommend the annual stool testing for blood. This test is called a FIT test and it looks for blood in the stool.     To schedule an appointment for your colonoscopy, please see the attached referral.     Mammogram    Mammogram for breast cancer   Recommended every 1-2 years for women age 50 and older  Mammograms help detect breast cancer, which is the most common cancer among women in the [place].  You may need to start having mammograms earlier and more often if you have had breast cancer, breast problems, or a family history of breast cancer.     Cervical Cancer Screening    Pap smear is a screening test used to detect cervical cancer. It is recommended every three years for women 21 and older. The test should be done at least once every three years but women who are at greater risk for cervical cancer may need to have the test more often.  Immunizations    To schedule an appointment or discuss this  screening further, you may contact us by phone at the [facility] at [phone] or online through the patient [email or web address] @ [email or web address]    If you have had any of the screenings listed above at another facility, please call us so that we may update your chart.      Your partners in health,      Quality Committee at [facility]

## 2022-06-01 NOTE — TELEPHONE ENCOUNTER
Patient Quality Outreach    Patient is due for the following:   Breast Cancer Screening - Mammogram  Cervical Cancer Screening - PAP Needed  Immunizations  -  Zoster    NEXT STEPS:   Letter send to Pt    Type of outreach:    Sent letter.    Next Steps:  Reach out within 90 days via Letter.    Max number of attempts reached: Yes. Will try again in 90 days if patient still on fail list.    Questions for provider review:    None     Emily Ureña MA  Chart routed to Care Team.

## 2022-11-16 DIAGNOSIS — E78.5 HYPERLIPIDEMIA WITH TARGET LDL LESS THAN 160: ICD-10-CM

## 2022-11-16 RX ORDER — PRAVASTATIN SODIUM 20 MG
TABLET ORAL
Qty: 30 TABLET | Refills: 0 | Status: SHIPPED | OUTPATIENT
Start: 2022-11-16 | End: 2023-01-05

## 2022-11-16 NOTE — TELEPHONE ENCOUNTER
Called and left a voicemail message to return our call to schedule an annual follow up before the next refill is needed.  Chaya Gonzalez MA  [facility]  2nd Floor  Primary Care

## 2022-11-21 DIAGNOSIS — E03.9 ACQUIRED HYPOTHYROIDISM: ICD-10-CM

## 2022-11-29 RX ORDER — LEVOTHYROXINE SODIUM 150 UG/1
TABLET ORAL
Qty: 90 TABLET | Refills: 0 | Status: SHIPPED | OUTPATIENT
Start: 2022-11-29 | End: 2023-01-05

## 2022-11-29 NOTE — TELEPHONE ENCOUNTER
Called pt and scheduled appt for 1/5/23 and lab appt scheduled for 12/29/22. Please place thyroid lab orders.

## 2022-11-29 NOTE — TELEPHONE ENCOUNTER
Patient has an appt already scheduled for 1/5/2023.  Chaya Gonzalez [facility]  2nd Floor  Primary Care

## 2022-12-13 ENCOUNTER — DOCUMENTATION ONLY (OUTPATIENT)
Dept: LAB | Facility: OTHER | Age: 62
End: 2022-12-13

## 2022-12-13 DIAGNOSIS — R73.03 PREDIABETES: ICD-10-CM

## 2022-12-13 DIAGNOSIS — E78.5 HYPERLIPIDEMIA LDL GOAL <130: ICD-10-CM

## 2022-12-13 DIAGNOSIS — E03.9 HYPOTHYROIDISM, UNSPECIFIED TYPE: ICD-10-CM

## 2022-12-13 DIAGNOSIS — Z86.39 HISTORY OF VITAMIN D DEFICIENCY: Primary | ICD-10-CM

## 2022-12-13 NOTE — PROGRESS NOTES
Ervin Shiela Adan has an upcoming lab appointment:    Future Appointments   Date Time Provider Department Center   12/29/2022 10:30 AM [facility]   1/5/2023  1:00 PM Aliya Cisse PA-C [facility]     Patient is scheduled for the following lab(s): Annual Labs    There is no order available. Please review and place either future orders or HMPO (Review of Health Maintenance Protocol Orders), as appropriate.    Health Maintenance Due   Topic     LIPID      TSH W/FREE T4 REFLEX      ANNUAL REVIEW OF HM ORDERS      Wendy Cannon

## 2022-12-29 ENCOUNTER — LAB (OUTPATIENT)
Dept: LAB | Facility: CLINIC | Age: 62
End: 2022-12-29
Payer: COMMERCIAL

## 2022-12-29 DIAGNOSIS — E78.5 HYPERLIPIDEMIA LDL GOAL <130: ICD-10-CM

## 2022-12-29 DIAGNOSIS — Z86.39 HISTORY OF VITAMIN D DEFICIENCY: ICD-10-CM

## 2022-12-29 DIAGNOSIS — E03.9 HYPOTHYROIDISM, UNSPECIFIED TYPE: ICD-10-CM

## 2022-12-29 DIAGNOSIS — R73.03 PREDIABETES: ICD-10-CM

## 2022-12-29 LAB
ALBUMIN SERPL-MCNC: 3.6 G/DL (ref 3.4–5)
ALP SERPL-CCNC: 85 U/L (ref 40–150)
ALT SERPL W P-5'-P-CCNC: 42 U/L (ref 0–50)
ANION GAP SERPL CALCULATED.3IONS-SCNC: 6 MMOL/L (ref 3–14)
AST SERPL W P-5'-P-CCNC: 19 U/L (ref 0–45)
BILIRUB SERPL-MCNC: 0.6 MG/DL (ref 0.2–1.3)
BUN SERPL-MCNC: 17 MG/DL (ref 7–30)
CALCIUM SERPL-MCNC: 9.1 MG/DL (ref 8.5–10.1)
CHLORIDE BLD-SCNC: 109 MMOL/L (ref 94–109)
CHOLEST SERPL-MCNC: 262 MG/DL
CO2 SERPL-SCNC: 25 MMOL/L (ref 20–32)
CREAT SERPL-MCNC: 0.66 MG/DL (ref 0.52–1.04)
DEPRECATED CALCIDIOL+CALCIFEROL SERPL-MC: 19 UG/L (ref 20–75)
FASTING STATUS PATIENT QL REPORTED: YES
GFR SERPL CREATININE-BSD FRML MDRD: >90 ML/MIN/1.73M2
GLUCOSE BLD-MCNC: 116 MG/DL (ref 70–99)
HBA1C MFR BLD: 6 % (ref 0–5.6)
HDLC SERPL-MCNC: 38 MG/DL
LDLC SERPL CALC-MCNC: 185 MG/DL
NONHDLC SERPL-MCNC: 224 MG/DL
POTASSIUM BLD-SCNC: 4.1 MMOL/L (ref 3.4–5.3)
PROT SERPL-MCNC: 7.2 G/DL (ref 6.8–8.8)
SODIUM SERPL-SCNC: 140 MMOL/L (ref 133–144)
TRIGL SERPL-MCNC: 197 MG/DL
TSH SERPL DL<=0.005 MIU/L-ACNC: 1.35 MU/L (ref 0.4–4)

## 2022-12-29 PROCEDURE — 36415 COLL VENOUS BLD VENIPUNCTURE: CPT

## 2022-12-29 PROCEDURE — 82306 VITAMIN D 25 HYDROXY: CPT

## 2022-12-29 PROCEDURE — 80053 COMPREHEN METABOLIC PANEL: CPT

## 2022-12-29 PROCEDURE — 83036 HEMOGLOBIN GLYCOSYLATED A1C: CPT

## 2022-12-29 PROCEDURE — 84443 ASSAY THYROID STIM HORMONE: CPT

## 2022-12-29 PROCEDURE — 80061 LIPID PANEL: CPT

## 2023-01-05 ENCOUNTER — OFFICE VISIT (OUTPATIENT)
Dept: FAMILY MEDICINE | Facility: CLINIC | Age: 63
End: 2023-01-05
Payer: COMMERCIAL

## 2023-01-05 VITALS
BODY MASS INDEX: 38.28 KG/M2 | HEART RATE: 90 BPM | SYSTOLIC BLOOD PRESSURE: 124 MMHG | TEMPERATURE: 97.8 F | HEIGHT: 62 IN | WEIGHT: 208 LBS | OXYGEN SATURATION: 98 % | DIASTOLIC BLOOD PRESSURE: 80 MMHG

## 2023-01-05 DIAGNOSIS — Z12.31 VISIT FOR SCREENING MAMMOGRAM: ICD-10-CM

## 2023-01-05 DIAGNOSIS — E03.9 ACQUIRED HYPOTHYROIDISM: ICD-10-CM

## 2023-01-05 DIAGNOSIS — Z12.31 ENCOUNTER FOR SCREENING MAMMOGRAM FOR BREAST CANCER: ICD-10-CM

## 2023-01-05 DIAGNOSIS — E55.9 VITAMIN D DEFICIENCY: ICD-10-CM

## 2023-01-05 DIAGNOSIS — R73.03 PREDIABETES: ICD-10-CM

## 2023-01-05 DIAGNOSIS — E78.5 HYPERLIPIDEMIA WITH TARGET LDL LESS THAN 160: Primary | ICD-10-CM

## 2023-01-05 DIAGNOSIS — Z12.11 SCREEN FOR COLON CANCER: ICD-10-CM

## 2023-01-05 DIAGNOSIS — Z12.4 CERVICAL CANCER SCREENING: ICD-10-CM

## 2023-01-05 PROCEDURE — 99214 OFFICE O/P EST MOD 30 MIN: CPT | Performed by: PHYSICIAN ASSISTANT

## 2023-01-05 RX ORDER — CHOLECALCIFEROL (VITAMIN D3) 50 MCG
1 TABLET ORAL DAILY
Qty: 90 TABLET | Refills: 3 | Status: SHIPPED | OUTPATIENT
Start: 2023-01-05 | End: 2024-01-22

## 2023-01-05 RX ORDER — ATORVASTATIN CALCIUM 20 MG/1
20 TABLET, FILM COATED ORAL DAILY
Qty: 90 TABLET | Refills: 3 | Status: SHIPPED | OUTPATIENT
Start: 2023-01-05 | End: 2024-04-11

## 2023-01-05 RX ORDER — LEVOTHYROXINE SODIUM 150 UG/1
150 TABLET ORAL DAILY
Qty: 90 TABLET | Refills: 3 | Status: SHIPPED | OUTPATIENT
Start: 2023-01-05 | End: 2024-01-22

## 2023-01-05 ASSESSMENT — PAIN SCALES - GENERAL: PAINLEVEL: NO PAIN (0)

## 2023-01-05 ASSESSMENT — ANXIETY QUESTIONNAIRES
IF YOU CHECKED OFF ANY PROBLEMS ON THIS QUESTIONNAIRE, HOW DIFFICULT HAVE THESE PROBLEMS MADE IT FOR YOU TO DO YOUR WORK, TAKE CARE OF THINGS AT HOME, OR GET ALONG WITH OTHER PEOPLE: NOT DIFFICULT AT ALL
1. FEELING NERVOUS, ANXIOUS, OR ON EDGE: NOT AT ALL
7. FEELING AFRAID AS IF SOMETHING AWFUL MIGHT HAPPEN: SEVERAL DAYS
5. BEING SO RESTLESS THAT IT IS HARD TO SIT STILL: NOT AT ALL
GAD7 TOTAL SCORE: 1
7. FEELING AFRAID AS IF SOMETHING AWFUL MIGHT HAPPEN: SEVERAL DAYS
GAD7 TOTAL SCORE: 1
2. NOT BEING ABLE TO STOP OR CONTROL WORRYING: NOT AT ALL
6. BECOMING EASILY ANNOYED OR IRRITABLE: NOT AT ALL
8. IF YOU CHECKED OFF ANY PROBLEMS, HOW DIFFICULT HAVE THESE MADE IT FOR YOU TO DO YOUR WORK, TAKE CARE OF THINGS AT HOME, OR GET ALONG WITH OTHER PEOPLE?: NOT DIFFICULT AT ALL
4. TROUBLE RELAXING: NOT AT ALL
3. WORRYING TOO MUCH ABOUT DIFFERENT THINGS: NOT AT ALL

## 2023-01-05 NOTE — PROGRESS NOTES
"  Assessment & Plan   Problem List Items Addressed This Visit        Endocrine    Hypothyroidism, unspecified hypothyroidism type    Relevant Medications    levothyroxine (SYNTHROID/LEVOTHROID) 150 MCG tablet   Other Visit Diagnoses     Hyperlipidemia with target LDL less than 160    -  Primary    Relevant Medications    atorvastatin (LIPITOR) 20 MG tablet    Visit for screening mammogram        Relevant Orders    MA SCREENING DIGITAL BILAT - Future  (s+30)    Encounter for screening mammogram for breast cancer        Relevant Orders    MA SCREENING DIGITAL BILAT - Future  (s+30)    Screen for colon cancer        Relevant Orders    Fecal colorectal cancer screen FIT - Future (S+30)    Cervical cancer screening        Vitamin D deficiency        Relevant Medications    vitamin D3 (CHOLECALCIFEROL) 50 mcg (2000 units) tablet    Prediabetes        Relevant Orders    Hemoglobin A1c       thyroid-stable continue  current dose  Hyperlipidemia-start Lipitor 20mg daily   Prediabetes- reduce carbohydrates and plain sugars  Increase fiber and water to relieve occasional constiptaion  Repeat lab in 6 months     Start vit D 3 2,000 units daily   Take hair skin and nail vitamin over the counter   Complete FIT  Schedule mammo  Patient declined to perform PAP      20 minutes spent on the date of the encounter doing chart review, history and exam, documentation and further activities per the note       BMI:   Estimated body mass index is 38.04 kg/m  as calculated from the following:    Height as of this encounter: 1.575 m (5' 2\").    Weight as of this encounter: 94.3 kg (208 lb).   Weight management plan: Discussed healthy diet and exercise guidelines        Return in about 6 months (around 7/5/2023) for labs .    Aliya Cisse PA-C  [facility] JUAN Ervin is a 62 year old{, presenting for the following health issues:  Thyroid Problem      History of Present Illness       Reason for " "visit:  Reglar check up    She eats 2-3 servings of fruits and vegetables daily.She consumes 1 sweetened beverage(s) daily.She exercises with enough effort to increase her heart rate 9 or less minutes per day.    She is taking medications regularly.  Today's DK-7 Score: 1       Hypothyroidism Follow-up      Since last visit, patient describes the following symptoms: constipation and hair loss    Hyperlipidemia Follow-Up      Are you regularly taking any medication or supplement to lower your cholesterol?   No    Are you having muscle aches or other side effects that you think could be caused by your cholesterol lowering medication?  No      Review of Systems   Constitutional, HEENT, cardiovascular, pulmonary, gi and gu systems are negative, except as otherwise noted.      Objective    /80 (BP Location: Left arm, Patient Position: Chair, Cuff Size: Adult Regular)   Pulse 90   Temp 97.8  F (36.6  C) (Tympanic)   Ht 1.575 m (5' 2\")   Wt 94.3 kg (208 lb)   SpO2 98%   BMI 38.04 kg/m    Body mass index is 38.04 kg/m .  Physical Exam   GENERAL: healthy, alert and no distress  NECK: no adenopathy, no asymmetry, masses, or scars and thyroid normal to palpation  RESP: lungs clear to auscultation - no rales, rhonchi or wheezes  CV: regular rate and rhythm, normal S1 S2, no S3 or S4, no murmur, click or rub, no peripheral edema and peripheral pulses strong  ABDOMEN: soft, nontender, no hepatosplenomegaly, no masses and bowel sounds normal  MS: no gross musculoskeletal defects noted, no edema  SKIN: no suspicious lesions or rashes  NEURO: Normal strength and tone, mentation intact and speech normal  BACK: no CVA tenderness, no paralumbar tenderness  PSYCH: mentation appears normal, affect normal/bright    Lab on 12/29/2022   Component Date Value Ref Range Status     TSH 12/29/2022 1.35  0.40 - 4.00 mU/L Final     Hemoglobin A1C 12/29/2022 6.0 (H)  0.0 - 5.6 % Final    Normal <5.7%   Prediabetes 5.7-6.4%    Diabetes " 6.5% or higher     Note: Adopted from ADA consensus guidelines.     Sodium 12/29/2022 140  133 - 144 mmol/L Final     Potassium 12/29/2022 4.1  3.4 - 5.3 mmol/L Final     Chloride 12/29/2022 109  94 - 109 mmol/L Final     Carbon Dioxide (CO2) 12/29/2022 25  20 - 32 mmol/L Final     Anion Gap 12/29/2022 6  3 - 14 mmol/L Final     Urea Nitrogen 12/29/2022 17  7 - 30 mg/dL Final     Creatinine 12/29/2022 0.66  0.52 - 1.04 mg/dL Final     Calcium 12/29/2022 9.1  8.5 - 10.1 mg/dL Final     Glucose 12/29/2022 116 (H)  70 - 99 mg/dL Final     Alkaline Phosphatase 12/29/2022 85  40 - 150 U/L Final     AST 12/29/2022 19  0 - 45 U/L Final     ALT 12/29/2022 42  0 - 50 U/L Final     Protein Total 12/29/2022 7.2  6.8 - 8.8 g/dL Final     Albumin 12/29/2022 3.6  3.4 - 5.0 g/dL Final     Bilirubin Total 12/29/2022 0.6  0.2 - 1.3 mg/dL Final     GFR Estimate 12/29/2022 >90  >60 mL/min/1.73m2 Final    Effective December 21, 2021 eGFRcr in adults is calculated using the 2021 CKD-EPI creatinine equation which includes age and gender (Timur kimble al., NEJ, DOI: 10.1056/ZOLSus3916614)     Cholesterol 12/29/2022 262 (H)  <200 mg/dL Final     Triglycerides 12/29/2022 197 (H)  <150 mg/dL Final     Direct Measure HDL 12/29/2022 38 (L)  >=50 mg/dL Final     LDL Cholesterol Calculated 12/29/2022 185 (H)  <=100 mg/dL Final     Non HDL Cholesterol 12/29/2022 224 (H)  <130 mg/dL Final     Patient Fasting > 8hrs? 12/29/2022 Yes   Final     Vitamin D, Total (25-Hydroxy) 12/29/2022 19 (L)  20 - 75 ug/L Final

## 2023-01-05 NOTE — PATIENT INSTRUCTIONS
At [facility], we strive to deliver an exceptional experience to you, every time we see you. If you receive a survey, please complete it as we do value your feedback.  If you have MyChart, you can expect to receive results automatically within 24 hours of their completion.  Your provider will send a note interpreting your results as well.   If you do not have MyChart, you should receive your results in about a week by mail.    Your care team:                            Family Medicine Internal Medicine   MD Brian Hylton MD Shantel Branch-Fleming, MD Srinivasa Vaka, MD Katya Belousova, PAKRIS Dumont CNP, MD ([place]) Pediatrics   Jus Jennings, MD Tiffanie Lopez MD Amelia Massimini APRN PERLA Han APRN MD Rachel Rubio MD          Clinic hours: Monday - Thursday 7 am-6 pm; Fridays 7 am-5 pm.   Urgent care: Monday - Friday 10 am- 8 pm; Saturday and Sunday 9 am-5 pm.    Clinic: [phone]       [facility]: Monday - Thursday 8 am - 7 pm; Friday 8 am - 6 pm  [facility]: [phone]

## 2023-01-16 ENCOUNTER — TELEPHONE (OUTPATIENT)
Dept: FAMILY MEDICINE | Facility: CLINIC | Age: 63
End: 2023-01-16

## 2023-01-16 NOTE — TELEPHONE ENCOUNTER
Patient Quality Outreach    Patient is due for the following:   Breast Cancer Screening - Mammogram    Next Steps:   Patient declined follow up at this time.    Type of outreach:    Phone, spoke to patient/parent. Patient/parent will call back to schedule.    Next Steps:  Reach out within 90 days via [employer].    Max number of attempts reached: Yes. Will try again in 90 days if patient still on fail list.    Questions for provider review:    None     Karlos Velasquez      .

## 2023-04-20 ENCOUNTER — TELEPHONE (OUTPATIENT)
Dept: FAMILY MEDICINE | Facility: CLINIC | Age: 63
End: 2023-04-20
Payer: COMMERCIAL

## 2023-04-20 NOTE — TELEPHONE ENCOUNTER
Patient Quality Outreach    Patient is due for the following:   Colon Cancer Screening  Breast Cancer Screening - Mammogram  Cervical Cancer Screening - PAP Needed  Physical Preventive Adult Physical      Topic Date Due     Zoster (Shingles) Vaccine (1 of 2) Never done     COVID-19 Vaccine (3 - Booster for Pfizer series) 12/30/2021     Flu Vaccine (1) 09/01/2022       Next Steps:   Schedule a Adult Preventative    Type of outreach:    Sent letter.      Questions for provider review:    None     Karina Menendez MA      .

## 2023-04-20 NOTE — LETTER
April 20, 2023    Aziza Adan  [address]  [place] [facility] [address]    Dear Aziza Adan,     At [facility] we care about your health and are committed to providing quality patient care.    Which includes staying current on preventive cancer screenings.  You can increase your chances of finding and treating cancers through regular screenings.      Our records indicate you may be due for the following preventive screening(s):  Colon Cancer Screening  Breast Cancer Screening - Mammogram  Cervical Cancer Screening - PAP Needed  Physical Preventive Adult Physical      Topic Date Due     Zoster (Shingles) Vaccine (1 of 2) Never done     COVID-19 Vaccine (3 - Booster for Pfizer series) 12/30/2021     Flu Vaccine (1) 09/01/2022       To schedule an appointment or discuss this screening further, you may contact us by phone at the [facility] at [phone] or online through the patient [email or web address] @ [email or web address]    If you have had any of the screenings listed above at another facility, please call us so that we may update your chart.      Your partners in health,      Quality Committee at [facility]

## 2023-10-20 ENCOUNTER — TELEPHONE (OUTPATIENT)
Dept: FAMILY MEDICINE | Facility: CLINIC | Age: 63
End: 2023-10-20
Payer: COMMERCIAL

## 2023-10-20 NOTE — TELEPHONE ENCOUNTER
Patient Quality Outreach    Patient is due for the following:   Colon Cancer Screening  Breast Cancer Screening - Mammogram  Physical Annual Wellness Visit      Topic Date Due    Zoster (Shingles) Vaccine (1 of 2) Never done    Flu Vaccine (1) 09/01/2023    COVID-19 Vaccine (3 - 2023-24 season) 09/01/2023    Pneumococcal Vaccine (1 - PCV) 08/25/2023       Next Steps:   Schedule a Annual Wellness Visit    Type of outreach:    Sent letter.      Questions for provider review:    None           Karlos Velasquez

## 2023-10-20 NOTE — LETTER
October 20, 2023      Aziza Adan  [address]  [facility] [address]      Dear Aziza,    At [facility] we care about your health and are committed to providing quality patient care.    We are recommending that you:  Schedule a WELLNESS (Preventative/Physical) APPOINTMENT with your primary care provider. If you go elsewhere for your wellness appointments then please disregard this reminder    ,   Schedule a MAMMOGRAM which is due.    1 in 8 women will develop invasive breast cancer during her lifetime and it is the most common non-skin cancer in American women.  EARLY detection, new treatments, and a better understanding of the disease have increased survival rates - the 5 year survival rate in the 1960s was 63% and today it is close to 90%.    If you are under/uninsured, we recommend you contact the [facility]. They offer mammograms at no charge or on a sliding fee charge. You can schedule with them at [phone]. Please have them send us the results.      Please disregard this reminder if you have had this exam elsewhere within the last year.  It would be helpful for us to have a copy of your mammogram report in your file so that we can best coordinate your care - please contact us with when your test was done so we can update your record.    ,   Schedule a COLONOSCOPY to assess for colon cancer (due every 10 years or 5 years in higher risk situations.)       Colon cancer is now the second leading cause of cancer-related deaths in the [place] for both men and women and there are over 130,000 new cases and 50,000 deaths per year from colon cancer.  Colonoscopies can prevent 90-95% of these deaths.  Problem lesions can be removed before they ever become cancer.  This test is not only looking for cancer, but also getting rid of precancerious lesions.    If you are under/uninsured, we recommend you contact the [employer] [facility] program. [facility] is a free colorectal  cancer screening program that provides colonoscopies for eligible under/uninsured [place] men and women. If you are interested in receiving a free colonoscopy, please call [employer] at [phone] (mention code [email or web address]) to see if you re eligible.     If you do not wish to do a colonoscopy or cannot afford to do one, at this time, there is another option. It is called a FIT test or Fecal Immunochemical Occult Blood Test (take home stool sample kit).  It does not replace the colonoscopy for colorectal cancer screening, but it can detect hidden bleeding in the lower colon.  It does need to be repeated every year and if a positive result is obtained, you would be referred for a colonoscopy.    If you have completed either one of these tests at another facility, please call/respond to this message with the details of when and where the tests were done and if they were normal or not. Or have the records sent to our clinic so that we can best coordinate your care., and   Schedule a Nurse-Only appointment to update your immunizations: Your records indicate that you are not up to date with your immunizations, please schedule a nurse-only appointment to get these updated or update them at your next office visit. If this is incorrect, please disregard.    Here is a list of Health Maintenance topics that are due now or due soon:  Health Maintenance Due   Topic Date Due    DEXA  Never done    ZOSTER IMMUNIZATION (1 of 2) Never done    RSV VACCINE 60+ (1 - 1-dose 60+ series) Never done    MAMMO SCREENING  01/17/2019    COLORECTAL CANCER SCREENING  03/06/2020    ADVANCE CARE PLANNING  04/13/2022    DK ASSESSMENT  07/05/2023    MEDICARE ANNUAL WELLNESS VISIT  08/25/2023    FALL RISK ASSESSMENT  Never done    INFLUENZA VACCINE (1) 09/01/2023    COVID-19 Vaccine (3 - 2023-24 season) 09/01/2023    Pneumococcal Vaccine: 65+ Years (1 - PCV) 08/25/2023        To schedule an appointment or discuss this further, you may contact  us by phone at the [facility] at [phone] or online through the [email or web address] @ [email or web address]    Thank you for trusting [facility] and we appreciate the opportunity to serve you.  We look forward to supporting your healthcare needs in the future.    Your partners in health,      Quality Committee at [facility]

## 2023-11-02 ENCOUNTER — TRANSFERRED RECORDS (OUTPATIENT)
Dept: MULTI SPECIALTY CLINIC | Facility: CLINIC | Age: 63
End: 2023-11-02

## 2024-01-22 DIAGNOSIS — E03.9 ACQUIRED HYPOTHYROIDISM: ICD-10-CM

## 2024-01-22 DIAGNOSIS — E55.9 VITAMIN D DEFICIENCY: ICD-10-CM

## 2024-01-22 RX ORDER — CHOLECALCIFEROL (VITAMIN D3) 50 MCG
1 TABLET ORAL DAILY
Qty: 30 TABLET | Refills: 0 | Status: SHIPPED | OUTPATIENT
Start: 2024-01-22 | End: 2024-04-11

## 2024-01-22 RX ORDER — LEVOTHYROXINE SODIUM 150 UG/1
150 TABLET ORAL DAILY
Qty: 30 TABLET | Refills: 0 | Status: SHIPPED | OUTPATIENT
Start: 2024-01-22 | End: 2024-05-14

## 2024-01-24 ENCOUNTER — NURSE TRIAGE (OUTPATIENT)
Dept: FAMILY MEDICINE | Facility: CLINIC | Age: 64
End: 2024-01-24
Payer: COMMERCIAL

## 2024-01-24 NOTE — TELEPHONE ENCOUNTER
Patient calling, states that she had dental work completed on 12/19/23 and now has sinus pain/pressure. Patient has since seen dentist and they recommended patient be seen by FP as they feel it may be a sinus infection. Patient has had pain/pressure in nose/eyes for approximately one month. Denies any cold symptoms, nasal drainage, or fever. Notes that she has mild headache from pressure.     Writer discussed need for appointment today/tomorrow, no openings at home clinic, writer offered other sites throughout system with openings, patient denied. Patient declined appointment tomorrow 1/25/24 due to dentist appointment, requesting appointment on Friday 1/26/24. Writer assisted in scheduling appointment per patient preference, also discussed that [facility] has UC option, patient states understanding and will proceed there if she feels it is needed. Writer discussed that if any trouble breathing, fever, or severe headache occur to please proceed to ED/UC.    TERI Smiley RN  [facility] [place]      Reason for Disposition   Sinus congestion (pressure, fullness) present > 10 days    Additional Information   Negative: Sounds like a life-threatening emergency to the triager   Negative: Difficulty breathing, and not from stuffy nose (e.g., not relieved by cleaning out the nose)   Negative: SEVERE headache and has fever   Negative: Patient sounds very sick or weak to the triager   Negative: SEVERE sinus pain   Negative: SEVERE headache   Negative: Redness or swelling on the cheek, forehead, or around the eye   Negative: Fever > 103 F (39.4 C)   Negative: Fever > 101 F (38.3 C) and over 60 years of age   Negative: Fever > 100.0 F (37.8 C) and has diabetes mellitus or a weak immune system (e.g., HIV positive, cancer chemotherapy, organ transplant, splenectomy, chronic steroids)   Negative: Fever > 100.0 F (37.8 C) and bedridden (e.g., CVA, chronic illness, recovering from surgery)   Negative: Fever present > 3  "days (72 hours)   Negative: Fever returns after gone for over 24 hours and symptoms worse or not improved   Negative: Sinus pain (not just congestion) and fever   Negative: Earache    Answer Assessment - Initial Assessment Questions  1. LOCATION: \"Where does it hurt?\"       -In nose and eyes  2. ONSET: \"When did the sinus pain start?\"  (e.g., hours, days)       About a month ago after dental implant  3. SEVERITY: \"How bad is the pain?\"   (Scale 1-10; mild, moderate or severe)    - MILD (1-3): doesn't interfere with normal activities     - MODERATE (4-7): interferes with normal activities (e.g., work or school) or awakens from sleep    - SEVERE (8-10): excruciating pain and patient unable to do any normal activities         -moderate, constant  4. RECURRENT SYMPTOM: \"Have you ever had sinus problems before?\" If Yes, ask: \"When was the last time?\" and \"What happened that time?\"       No  5. NASAL CONGESTION: \"Is the nose blocked?\" If Yes, ask: \"Can you open it or must you breathe through your mouth?\"      Using mouth  6. NASAL DISCHARGE: \"Do you have discharge from your nose?\" If so ask, \"What color?\"      No  7. FEVER: \"Do you have a fever?\" If Yes, ask: \"What is it, how was it measured, and when did it start?\"       No  8. OTHER SYMPTOMS: \"Do you have any other symptoms?\" (e.g., sore throat, cough, earache, difficulty breathing)      -No  9. PREGNANCY: \"Is there any chance you are pregnant?\" \"When was your last menstrual period?\"      NA    Protocols used: Sinus Pain or Congestion-A-OH    "

## 2024-01-26 ENCOUNTER — OFFICE VISIT (OUTPATIENT)
Dept: FAMILY MEDICINE | Facility: CLINIC | Age: 64
End: 2024-01-26
Payer: COMMERCIAL

## 2024-01-26 VITALS
TEMPERATURE: 98.6 F | HEART RATE: 76 BPM | WEIGHT: 207.6 LBS | RESPIRATION RATE: 16 BRPM | DIASTOLIC BLOOD PRESSURE: 82 MMHG | SYSTOLIC BLOOD PRESSURE: 135 MMHG | HEIGHT: 61 IN | BODY MASS INDEX: 39.2 KG/M2 | OXYGEN SATURATION: 96 %

## 2024-01-26 DIAGNOSIS — Z12.11 SCREEN FOR COLON CANCER: ICD-10-CM

## 2024-01-26 DIAGNOSIS — E55.9 VITAMIN D DEFICIENCY: ICD-10-CM

## 2024-01-26 DIAGNOSIS — Z12.31 VISIT FOR SCREENING MAMMOGRAM: ICD-10-CM

## 2024-01-26 DIAGNOSIS — E66.01 MORBID OBESITY (H): ICD-10-CM

## 2024-01-26 DIAGNOSIS — Z13.1 SCREENING FOR DIABETES MELLITUS: ICD-10-CM

## 2024-01-26 DIAGNOSIS — T85.628A: Primary | ICD-10-CM

## 2024-01-26 DIAGNOSIS — E03.9 ACQUIRED HYPOTHYROIDISM: ICD-10-CM

## 2024-01-26 DIAGNOSIS — E78.5 HYPERLIPIDEMIA LDL GOAL <130: ICD-10-CM

## 2024-01-26 LAB
CHOLEST SERPL-MCNC: 276 MG/DL
FASTING STATUS PATIENT QL REPORTED: NO
HBA1C MFR BLD: 5.9 % (ref 0–5.6)
HDLC SERPL-MCNC: 38 MG/DL
LDLC SERPL CALC-MCNC: 202 MG/DL
NONHDLC SERPL-MCNC: 238 MG/DL
TRIGL SERPL-MCNC: 181 MG/DL
TSH SERPL DL<=0.005 MIU/L-ACNC: 1.89 UIU/ML (ref 0.3–4.2)
VIT D+METAB SERPL-MCNC: 18 NG/ML (ref 20–50)

## 2024-01-26 PROCEDURE — 83036 HEMOGLOBIN GLYCOSYLATED A1C: CPT | Performed by: FAMILY MEDICINE

## 2024-01-26 PROCEDURE — 80061 LIPID PANEL: CPT | Performed by: FAMILY MEDICINE

## 2024-01-26 PROCEDURE — 99214 OFFICE O/P EST MOD 30 MIN: CPT | Performed by: FAMILY MEDICINE

## 2024-01-26 PROCEDURE — 82306 VITAMIN D 25 HYDROXY: CPT | Performed by: FAMILY MEDICINE

## 2024-01-26 PROCEDURE — 84443 ASSAY THYROID STIM HORMONE: CPT | Performed by: FAMILY MEDICINE

## 2024-01-26 PROCEDURE — 36415 COLL VENOUS BLD VENIPUNCTURE: CPT | Performed by: FAMILY MEDICINE

## 2024-01-26 ASSESSMENT — ANXIETY QUESTIONNAIRES
2. NOT BEING ABLE TO STOP OR CONTROL WORRYING: NOT AT ALL
3. WORRYING TOO MUCH ABOUT DIFFERENT THINGS: NOT AT ALL
7. FEELING AFRAID AS IF SOMETHING AWFUL MIGHT HAPPEN: NOT AT ALL
4. TROUBLE RELAXING: NOT AT ALL
GAD7 TOTAL SCORE: 0
8. IF YOU CHECKED OFF ANY PROBLEMS, HOW DIFFICULT HAVE THESE MADE IT FOR YOU TO DO YOUR WORK, TAKE CARE OF THINGS AT HOME, OR GET ALONG WITH OTHER PEOPLE?: NOT DIFFICULT AT ALL
5. BEING SO RESTLESS THAT IT IS HARD TO SIT STILL: NOT AT ALL
6. BECOMING EASILY ANNOYED OR IRRITABLE: NOT AT ALL
1. FEELING NERVOUS, ANXIOUS, OR ON EDGE: NOT AT ALL
GAD7 TOTAL SCORE: 0
7. FEELING AFRAID AS IF SOMETHING AWFUL MIGHT HAPPEN: NOT AT ALL
IF YOU CHECKED OFF ANY PROBLEMS ON THIS QUESTIONNAIRE, HOW DIFFICULT HAVE THESE PROBLEMS MADE IT FOR YOU TO DO YOUR WORK, TAKE CARE OF THINGS AT HOME, OR GET ALONG WITH OTHER PEOPLE: NOT DIFFICULT AT ALL

## 2024-01-26 NOTE — LETTER
February 1, 2024      Aziza Adan  [address]  [facility] [address]      Ms. Adan,     Here is a summary of your recent test results:   -LDL(bad) cholesterol level is elevated, HDL(good) cholesterol level is low and your triglycerides are elevated which can increase your heart disease risk.  A diet high in fat and simple carbohydrates, genetics and being overweight can contribute to this. ADVISE: exercising 150 minutes of aerobic exercise per week (30 minutes for 5 days per week or 50 minutes for 3 days per week are options), eating a low saturated fat/low carbohydrate diet, and omega-3 fatty acids (fish oil) 7768-8050 mg daily are helpful to improve this. In 3 months, you should recheck your fasting cholesterol panel by scheduling a lab-only appointment.   YOur labs show you are still in the prediabetic range. Please reduce your bread, rice and sugar intake.   -TSH (thyroid stimulating hormone) level is normal which indicates normal thyroid function.   -Vitamin D level is low and oral supplementation should be started.  ADVISE: starting over the counter Cholecalciferol (Vitamin D3) 5000 IU daily for 6 weeks and then 1000 IU daily to maintain levels.  Then in 2-3 months, please schedule a lab only appointment to recheck your Vitamin D levels.   For additional lab test information, [email or web address] is an excellent reference.     Please contact the clinic if you have additional questions.  Thank you.     Sincerely,     Jesica Sung MD       Resulted Orders   Lipid panel reflex to direct LDL Non-fasting   Result Value Ref Range    Cholesterol 276 (H) <200 mg/dL    Triglycerides 181 (H) <150 mg/dL    Direct Measure HDL 38 (L) >=50 mg/dL    LDL Cholesterol Calculated 202 (H) <=100 mg/dL    Non HDL Cholesterol 238 (H) <130 mg/dL    Patient Fasting > 8hrs? No     Narrative    Cholesterol  Desirable:  <200 mg/dL    Triglycerides  Normal:  Less than 150 mg/dL  Borderline High:  150-199  mg/dL  High:  200-499 mg/dL  Very High:  Greater than or equal to 500 mg/dL    Direct Measure HDL  Female:  Greater than or equal to 50 mg/dL   Male:  Greater than or equal to 40 mg/dL    LDL Cholesterol  Desirable:  <100mg/dL  Above Desirable:  100-129 mg/dL   Borderline High:  130-159 mg/dL   High:  160-189 mg/dL   Very High:  >= 190 mg/dL    Non HDL Cholesterol  Desirable:  130 mg/dL  Above Desirable:  130-159 mg/dL  Borderline High:  160-189 mg/dL  High:  190-219 mg/dL  Very High:  Greater than or equal to 220 mg/dL   TSH WITH FREE T4 REFLEX   Result Value Ref Range    TSH 1.89 0.30 - 4.20 uIU/mL   Hemoglobin A1c   Result Value Ref Range    Hemoglobin A1C 5.9 (H) 0.0 - 5.6 %      Comment:      Normal <5.7%   Prediabetes 5.7-6.4%    Diabetes 6.5% or higher     Note: Adopted from ADA consensus guidelines.   Vitamin D Deficiency   Result Value Ref Range    Vitamin D, Total (25-Hydroxy) 18 (L) 20 - 50 ng/mL      Comment:      mild to moderate deficiency    Narrative    Season, race, dietary intake, and treatment affect the concentration of 25-hydroxy-Vitamin D. Values may decrease during winter months and increase during summer months.    Vitamin D determination is routinely performed by an immunoassay specific for 25 hydroxyvitamin D3.  If an individual is on vitamin D2(ergocalciferol) supplementation, please specify 25 OH vitamin D2 and D3 level determination by LCMSMS test [id number].

## 2024-01-26 NOTE — PROGRESS NOTES
"  Assessment & Plan     Malposition of dental implant  CT sinus since CBCT dental scan does not adequately evaluate sinus cavity though it does show the dental appliance anchor does enter into the maxillary sinus  - Adult ENT  Referral; Future  - CT Sinus w/o Contrast; Future    Vitamin D deficiency  Recheck levels    Hyperlipidemia LDL goal <130  Recheck status  - Lipid panel reflex to direct LDL Non-fasting; Future  - Lipid panel reflex to direct LDL Non-fasting    Acquired hypothyroidism  Check labs with medication adjustment as indicated.  - TSH WITH FREE T4 REFLEX; Future  - TSH WITH FREE T4 REFLEX    Visit for screening mammogram    - MA SCREENING DIGITAL BILAT - Future  (s+30); Future    Screen for colon cancer    - Fecal colorectal cancer screen FIT - Future (S+30); Future  - Fecal colorectal cancer screen FIT - Future (S+30)    Screening for diabetes mellitus    - Hemoglobin A1c; Future  - Hemoglobin A1c        Morbid Obesity/BMI  Estimated body mass index is 39.88 kg/m  as calculated from the following:    Height as of this encounter: 1.537 m (5' 0.5\").    Weight as of this encounter: 94.2 kg (207 lb 9.6 oz).   Weight management plan: Discussed healthy diet and exercise guidelines        Fabian Ervin is a 63 year old, presenting for the following health issues:  Sinus Problem, Thyroid Problem, Constipation, and Weight Problem    History of Present Illness       Hypothyroidism:     Since last visit, patient describes the following symptoms::  Constipation, Hair loss and Weight gain    Weight gain::  6-10 lbs.She consumes 2 sweetened beverage(s) daily.She exercises with enough effort to increase her heart rate 9 or less minutes per day.  She exercises with enough effort to increase her heart rate 3 or less days per week. She is missing 7 dose(s) of medications per week.       Acute Illness  Acute illness concerns: Possible Sinus Infection  Onset/Duration: One month   Symptoms:  Fever: " "No  Chills/Sweats: No  Headache (location?): Yes  Sinus Pressure: YES-  Feels pressure through eyes and nose.  Conjunctivitis:  No  Ear Pain: no  Rhinorrhea: No  Congestion: No  Sore Throat: No  Cough: no  Wheeze: No  Decreased Appetite: No  Nausea: No  Vomiting: No  Diarrhea: No  Dysuria/Freq.: No  Dysuria or Hematuria: No  Fatigue/Achiness: No  Sick/Strep Exposure: No  Therapies tried and outcome: Tylenol but did not help.           Objective    /82 (BP Location: Left arm, Patient Position: Sitting, Cuff Size: Adult Regular)   Pulse 76   Temp 98.6  F (37  C) (Oral)   Resp 16   Ht 1.537 m (5' 0.5\")   Wt 94.2 kg (207 lb 9.6 oz)   SpO2 96%   BMI 39.88 kg/m    Body mass index is 39.88 kg/m .  Physical Exam   GENERAL: alert, no distress, and obese  RESP: lungs clear to auscultation - no rales, rhonchi or wheezes  PSYCH: mentation appears normal, affect normal/bright            Signed Electronically by: Jesica Sung MD    "

## 2024-01-29 NOTE — TELEPHONE ENCOUNTER
"FUTURE VISIT INFORMATION      FUTURE VISIT INFORMATION:  Date: 2/28/24  Time: 9am  Location: [facility] [place]  REFERRAL INFORMATION:  Referring provider:  Jesica Morales MD  Referring providers clinic:   FP/IM/PEDS   Reason for visit/diagnosis  Malposition of dental implant- Referred by Jesica Morales MD in  FP/IM/PEDS     RECORDS REQUESTED FROM:       Clinic name Comments Records Status Imaging Status    [facility]/IM/PEDS   1/26/24 Jesica Morales MD [facility]     Imaging  2/2/24- ct sinus - schedule  Epic  PACS           \"Please notify/message CSS if patient completed outside imaging prior to scheduled appointment and/or any outside records that might have been missed at pre visit -Thank you\"  "

## 2024-02-01 NOTE — RESULT ENCOUNTER NOTE
Ms. Adan,    Here is a summary of your recent test results:  -LDL(bad) cholesterol level is elevated, HDL(good) cholesterol level is low and your triglycerides are elevated which can increase your heart disease risk.  A diet high in fat and simple carbohydrates, genetics and being overweight can contribute to this. ADVISE: exercising 150 minutes of aerobic exercise per week (30 minutes for 5 days per week or 50 minutes for 3 days per week are options), eating a low saturated fat/low carbohydrate diet, and omega-3 fatty acids (fish oil) 3120-1795 mg daily are helpful to improve this. In 3 months, you should recheck your fasting cholesterol panel by scheduling a lab-only appointment.  YOur labs show you are still in the prediabetic range. Please reduce your bread, rice and sugar intake.  -TSH (thyroid stimulating hormone) level is normal which indicates normal thyroid function.  -Vitamin D level is low and oral supplementation should be started.  ADVISE: starting over the counter Cholecalciferol (Vitamin D3) 5000 IU daily for 6 weeks and then 1000 IU daily to maintain levels.  Then in 2-3 months, please schedule a lab only appointment to recheck your Vitamin D levels.  For additional lab test information, [email or web address] is an excellent reference.    Please contact the clinic if you have additional questions.  Thank you.    Sincerely,    Jesica Sung MD

## 2024-02-08 ENCOUNTER — TELEPHONE (OUTPATIENT)
Dept: FAMILY MEDICINE | Facility: CLINIC | Age: 64
End: 2024-02-08
Payer: COMMERCIAL

## 2024-02-08 NOTE — LETTER
February 8, 2024      Aziza Adan  [address]  [place] [facility] [address]        Dear ,      Here is a summary of your recent test results:  -LDL(bad) cholesterol level is elevated, HDL(good) cholesterol level is low and your triglycerides are elevated which can increase your heart disease risk.  A diet high in fat and simple carbohydrates, genetics and being overweight can contribute to this. ADVISE: exercising 150 minutes of aerobic exercise per week (30 minutes for 5 days per week or 50 minutes for 3 days per week are options), eating a low saturated fat/low carbohydrate diet, and omega-3 fatty acids (fish oil) 5013-0424 mg daily are helpful to improve this. In 3 months, you should recheck your fasting cholesterol panel by scheduling a lab-only appointment.  YOur labs show you are still in the prediabetic range. Please reduce your bread, rice and sugar intake.  -TSH (thyroid stimulating hormone) level is normal which indicates normal thyroid function.  -Vitamin D level is low and oral supplementation should be started.  ADVISE: starting over the counter Cholecalciferol (Vitamin D3) 5000 IU daily for 6 weeks and then 1000 IU daily to maintain levels.  Then in 2-3 months, please schedule a lab only appointment to recheck your Vitamin D levels.  For additional lab test information, [email or web address] is an excellent reference.     Please contact the clinic if you have additional questions.  Thank you.     Sincerely,     Jesica Sung MD

## 2024-02-08 NOTE — TELEPHONE ENCOUNTER
Pt calling regarding recent lab results. RN relayed provider results message, pt verbalized understanding. Will send letter with provider results message for pt's records and to reference if needed.     RN updated pt address. Address in [facility] is listed as [address], but pt states she lives on [facility] [facility] [address] RN placed results letter in outgoing mail.     Tika Arellano RN

## 2024-02-28 ENCOUNTER — PRE VISIT (OUTPATIENT)
Dept: OTOLARYNGOLOGY | Facility: CLINIC | Age: 64
End: 2024-02-28

## 2024-04-11 ENCOUNTER — OFFICE VISIT (OUTPATIENT)
Dept: FAMILY MEDICINE | Facility: CLINIC | Age: 64
End: 2024-04-11
Payer: COMMERCIAL

## 2024-04-11 ENCOUNTER — TELEPHONE (OUTPATIENT)
Dept: FAMILY MEDICINE | Facility: CLINIC | Age: 64
End: 2024-04-11

## 2024-04-11 VITALS
HEART RATE: 79 BPM | TEMPERATURE: 98.2 F | OXYGEN SATURATION: 97 % | WEIGHT: 205.8 LBS | HEIGHT: 60 IN | SYSTOLIC BLOOD PRESSURE: 116 MMHG | BODY MASS INDEX: 40.4 KG/M2 | RESPIRATION RATE: 20 BRPM | DIASTOLIC BLOOD PRESSURE: 70 MMHG

## 2024-04-11 DIAGNOSIS — R05.1 ACUTE COUGH: Primary | ICD-10-CM

## 2024-04-11 DIAGNOSIS — J20.9 ACUTE BRONCHITIS, UNSPECIFIED ORGANISM: ICD-10-CM

## 2024-04-11 PROCEDURE — 99213 OFFICE O/P EST LOW 20 MIN: CPT | Performed by: PHYSICIAN ASSISTANT

## 2024-04-11 RX ORDER — BENZONATATE 200 MG/1
200 CAPSULE ORAL 3 TIMES DAILY PRN
Qty: 30 CAPSULE | Refills: 1 | Status: SHIPPED | OUTPATIENT
Start: 2024-04-11 | End: 2024-06-05

## 2024-04-11 RX ORDER — PREDNISONE 20 MG/1
20 TABLET ORAL 2 TIMES DAILY
Qty: 14 TABLET | Refills: 0 | Status: SHIPPED | OUTPATIENT
Start: 2024-04-11 | End: 2024-04-18

## 2024-04-11 ASSESSMENT — ENCOUNTER SYMPTOMS: COUGH: 1

## 2024-04-11 ASSESSMENT — PAIN SCALES - GENERAL: PAINLEVEL: NO PAIN (0)

## 2024-04-11 NOTE — PROGRESS NOTES
"    Fabian   Ervin is a 63 year old, presenting for the following health issues:  Cough (Possible reaction to cleaning chemicals or allergies?//)        4/11/2024     9:56 AM   Additional Questions   Roomed by Tigist Colby CMA   Accompanied by Daughter in law Carlos Velasquez         4/11/2024     9:56 AM   Patient Reported Additional Medications   Patient reports taking the following new medications none     History of Present Illness       Reason for visit:  Cough due to chemicals  Symptom onset:  3-4 weeks ago  Symptoms include:  Cough  Symptom intensity:  Moderate  Symptom progression:  Staying the same  Had these symptoms before:  Yes  Has tried/received treatment for these symptoms:  No  What makes it worse:  Cooking or smelling spices  What makes it better:  Herbal tea    She eats 2-3 servings of fruits and vegetables daily.She consumes 0 sweetened beverage(s) daily.She exercises with enough effort to increase her heart rate 10 to 19 minutes per day.  She exercises with enough effort to increase her heart rate 3 or less days per week.   She is taking medications regularly.     Harsh and dry cough. Some upper airway irritation. No congestion. No fevers. No gerd symptoms.   No profound sob/wheezing   No smoker      Review of Systems  Constitutional, neuro, ENT, endocrine, pulmonary, cardiac, gastrointestinal, genitourinary, musculoskeletal, integument and psychiatric systems are negative, except as otherwise noted.      Objective    /70   Pulse 79   Temp 98.2  F (36.8  C) (Temporal)   Resp 20   Ht 1.535 m (5' 0.43\")   Wt 93.4 kg (205 lb 12.8 oz)   SpO2 97%   BMI 39.62 kg/m    Body mass index is 39.62 kg/m .  Physical Exam   ENT exam reveals - bilateral TM normal without fluid or infection, neck without nodes, sinuses nontender, post nasal drip noted, nasal mucosa congested, and nasal mucosa pale and congested.  CHEST:chest clear to IPPA, no tachypnea, retractions or cyanosis, and S1, S2 normal, " no murmur, no gallop, rate regular.  Left eye normal, pupil reactive, normal conjunctiva, normal fundus, normal cornea.  Ervin was seen today for cough.    Diagnoses and all orders for this visit:    Acute cough  -     predniSONE (DELTASONE) 20 MG tablet; Take 1 tablet (20 mg) by mouth 2 times daily for 7 days  -     benzonatate (TESSALON) 200 MG capsule; Take 1 capsule (200 mg) by mouth 3 times daily as needed    Acute bronchitis, unspecified organism  -     predniSONE (DELTASONE) 20 MG tablet; Take 1 tablet (20 mg) by mouth 2 times daily for 7 days    Other orders  -     REVIEW OF HEALTH MAINTENANCE PROTOCOL ORDERS      Advised supportive and symptomatic treatment.  Follow up with Provider - if condition persists or worsens.     Signed Electronically by: Hans Hung PA-C

## 2024-04-11 NOTE — TELEPHONE ENCOUNTER
Patient Quality Outreach    Patient is due for the following:   Colon Cancer Screening  Breast Cancer Screening - Mammogram  Physical Annual Wellness Visit      Topic Date Due    Zoster (Shingles) Vaccine (1 of 2) Never done    Pneumococcal Vaccine (1 of 1 - PCV) Never done    Flu Vaccine (1) 09/01/2023    COVID-19 Vaccine (3 - 2023-24 season) 09/01/2023       Next Steps:   Schedule a Annual Wellness Visit    Type of outreach:    Phone, spoke to patient/parent. Will call back to schedule       Questions for provider review:    None           Karina Menendez MA

## 2024-05-14 ENCOUNTER — OFFICE VISIT (OUTPATIENT)
Dept: FAMILY MEDICINE | Facility: CLINIC | Age: 64
End: 2024-05-14
Payer: COMMERCIAL

## 2024-05-14 ENCOUNTER — ANCILLARY PROCEDURE (OUTPATIENT)
Dept: GENERAL RADIOLOGY | Facility: CLINIC | Age: 64
End: 2024-05-14
Attending: PHYSICIAN ASSISTANT
Payer: COMMERCIAL

## 2024-05-14 VITALS
RESPIRATION RATE: 16 BRPM | DIASTOLIC BLOOD PRESSURE: 74 MMHG | HEART RATE: 84 BPM | SYSTOLIC BLOOD PRESSURE: 114 MMHG | HEIGHT: 60 IN | WEIGHT: 210.2 LBS | BODY MASS INDEX: 41.27 KG/M2 | OXYGEN SATURATION: 96 % | TEMPERATURE: 98.1 F

## 2024-05-14 DIAGNOSIS — R09.82 PND (POST-NASAL DRIP): ICD-10-CM

## 2024-05-14 DIAGNOSIS — F32.1 MAJOR DEPRESSIVE DISORDER, SINGLE EPISODE, MODERATE (H): ICD-10-CM

## 2024-05-14 DIAGNOSIS — R05.2 SUBACUTE COUGH: Primary | ICD-10-CM

## 2024-05-14 DIAGNOSIS — E03.9 ACQUIRED HYPOTHYROIDISM: ICD-10-CM

## 2024-05-14 DIAGNOSIS — R05.2 SUBACUTE COUGH: ICD-10-CM

## 2024-05-14 PROCEDURE — 71046 X-RAY EXAM CHEST 2 VIEWS: CPT | Mod: TC | Performed by: RADIOLOGY

## 2024-05-14 PROCEDURE — 99213 OFFICE O/P EST LOW 20 MIN: CPT | Performed by: PHYSICIAN ASSISTANT

## 2024-05-14 RX ORDER — GUAIFENESIN AND DEXTROMETHORPHAN HYDROBROMIDE 1200; 60 MG/1; MG/1
1 TABLET, EXTENDED RELEASE ORAL 2 TIMES DAILY
Qty: 28 TABLET | Refills: 0 | Status: SHIPPED | OUTPATIENT
Start: 2024-05-14 | End: 2024-06-05

## 2024-05-14 RX ORDER — LEVOTHYROXINE SODIUM 150 UG/1
150 TABLET ORAL DAILY
Qty: 90 TABLET | Refills: 3 | Status: SHIPPED | OUTPATIENT
Start: 2024-05-14

## 2024-05-14 NOTE — PROGRESS NOTES
Assessment & Plan   Problem List Items Addressed This Visit          Endocrine    Hypothyroidism, unspecified hypothyroidism type    Relevant Medications    levothyroxine (SYNTHROID/LEVOTHROID) 150 MCG tablet       Behavioral    Major depressive disorder, single episode, moderate (H)     Other Visit Diagnoses       Subacute cough    -  Primary    Relevant Medications    Dextromethorphan-Guaifenesin  MG TB12    amoxicillin-clavulanate (AUGMENTIN) 875-125 MG tablet    Other Relevant Orders    XR Chest 2 Views    PND (post-nasal drip)        Relevant Medications    Dextromethorphan-Guaifenesin  MG TB12    amoxicillin-clavulanate (AUGMENTIN) 875-125 MG tablet           Augmentin 875/125 mg twice a day for 10 days   Mucinex DM 1 tablet twice a day for 10-14 days     Thyroid is stable-continue current medication     20 minutes spent by me on the date of the encounter doing chart review, history and exam, documentation and further activities per the note           Fabian   Ervin is a 63 year old, presenting for the following health issues:  dry cough        5/14/2024    11:23 AM   Additional Questions   Roomed by satya   Accompanied by self         5/14/2024    11:23 AM   Patient Reported Additional Medications   Patient reports taking the following new medications no     History of Present Illness       Reason for visit:  Cough  Symptom onset:  More than a month  Symptoms include:  Dry cough  Symptom intensity:  Moderate  Symptom progression:  Improving  Had these symptoms before:  Yes  Has tried/received treatment for these symptoms:  Yes  Previous treatment was successful:  No  What makes it worse:  Different smells make it worse  What makes it better:  Cough med, increase fluid intake, herbal    She eats 2-3 servings of fruits and vegetables daily.She consumes 1 sweetened beverage(s) daily.She exercises with enough effort to increase her heart rate 20 to 29 minutes per day.  She exercises with enough  "effort to increase her heart rate 3 or less days per week.   She is taking medications regularly.         Acute Illness  Acute illness concerns: cough, tickle in the throat, need to clear the throat all the time. Patient was in [place] and got a cold that cause a prolonged cough  Onset/Duration: 2 months   Symptoms:  Fever: No  Chills/Sweats: No  Headache (location?): No  Sinus Pressure: No  Conjunctivitis:  No  Ear Pain: no  Rhinorrhea: No  Congestion: No  Sore Throat: No  Cough: YES-non-productive  Wheeze: No  Decreased Appetite: No  Nausea: No  Vomiting: No  Diarrhea: No  Dysuria/Freq.: No  Dysuria or Hematuria: No  Fatigue/Achiness: No  Sick/Strep Exposure: No  Therapies tried and outcome: prednisone and tessalon was prescribed on 4/11/24    Hypothyroidism Follow-up    Since last visit, patient describes the following symptoms: Weight stable, no hair loss, no skin changes, no constipation, no loose stools  Patient reports that she has ran out of prescription at the pharmacy, so they gave her a small supple and reconnedned to follow up     Review of Systems  Constitutional, HEENT, cardiovascular, pulmonary, gi and gu systems are negative, except as otherwise noted.      Objective    /74 (BP Location: Left arm, Patient Position: Sitting, Cuff Size: Adult Large)   Pulse 84   Temp 98.1  F (36.7  C) (Oral)   Resp 16   Ht 1.511 m (4' 11.5\")   Wt 95.3 kg (210 lb 3.2 oz)   SpO2 96%   BMI 41.74 kg/m    Body mass index is 41.74 kg/m .    Physical Exam   GENERAL: alert and no distress  EYES: Eyes grossly normal to inspection, PERRL and conjunctivae and sclerae normal  HENT: normal cephalic/atraumatic, ear canals and TM's normal, nose and mouth without ulcers or lesions, , oral mucous membranes moist, and yellow PND in oropharynx   NECK: no adenopathy, no asymmetry, masses, or scars  RESP: lungs clear to auscultation - no rales, rhonchi or wheezes  CV: regular rate and rhythm, normal S1 S2, no S3 or S4, no " murmur, click or rub, no peripheral edema  ABDOMEN: soft, nontender, no hepatosplenomegaly, no masses and bowel sounds normal  MS: no gross musculoskeletal defects noted, no edema    Xray - Reviewed and interpreted by me.  No acute infiltrate or effusion. Awaiting radiology report         Signed Electronically by: Aliya Cisse PA-C

## 2024-06-05 ENCOUNTER — OFFICE VISIT (OUTPATIENT)
Dept: FAMILY MEDICINE | Facility: CLINIC | Age: 64
End: 2024-06-05
Payer: COMMERCIAL

## 2024-06-05 ENCOUNTER — NURSE TRIAGE (OUTPATIENT)
Dept: FAMILY MEDICINE | Facility: CLINIC | Age: 64
End: 2024-06-05

## 2024-06-05 VITALS
HEART RATE: 81 BPM | OXYGEN SATURATION: 97 % | RESPIRATION RATE: 20 BRPM | BODY MASS INDEX: 40.95 KG/M2 | WEIGHT: 208.6 LBS | HEIGHT: 60 IN | TEMPERATURE: 97.3 F | SYSTOLIC BLOOD PRESSURE: 122 MMHG | DIASTOLIC BLOOD PRESSURE: 79 MMHG

## 2024-06-05 DIAGNOSIS — R07.81 RIB PAIN ON LEFT SIDE: Primary | ICD-10-CM

## 2024-06-05 PROCEDURE — 99213 OFFICE O/P EST LOW 20 MIN: CPT

## 2024-06-05 ASSESSMENT — PATIENT HEALTH QUESTIONNAIRE - PHQ9
10. IF YOU CHECKED OFF ANY PROBLEMS, HOW DIFFICULT HAVE THESE PROBLEMS MADE IT FOR YOU TO DO YOUR WORK, TAKE CARE OF THINGS AT HOME, OR GET ALONG WITH OTHER PEOPLE: NOT DIFFICULT AT ALL
SUM OF ALL RESPONSES TO PHQ QUESTIONS 1-9: 4
SUM OF ALL RESPONSES TO PHQ QUESTIONS 1-9: 4

## 2024-06-05 ASSESSMENT — PAIN SCALES - GENERAL: PAINLEVEL: WORST PAIN (10)

## 2024-06-05 NOTE — PROGRESS NOTES
Assessment & Plan     Rib pain on left side  - Patient fell into a wooden fruit box while grocery shopping 5/30 and felt instant pain with pop/crack of ribs on left side. Pain has been worsening. She has been having trouble taking a deep breath.  - Will check XR to evaluate for rib fractures. Low suspicion for pneumonia/pneumothorax at this time but these will be ruled out also- No clear rib fracture on imaging. No pneumonia.  - Discussed pain control. She does not want to be prescribed pain medicine. Recommended icing, tylenol, ibuprofen as needed. OK to use topical pain relievers OTC such as Voltaren or lidocaine patches.   - Discussed the importance of taking deep breaths as often as possible and maintaining mobility to reduce risk of pneumonia development. Demonstrated how to splint coughs and deep breaths with a pillow to reduce pain.   - XR Chest 2 Views    Return to care for worsening pain, fever, cough, dyspnea.     Subjective   Ervin is a 63 year old, presenting for the following health issues:  Fall        6/5/2024     2:36 PM   Additional Questions   Roomed by lilia   Accompanied by sister         6/5/2024     2:36 PM   Patient Reported Additional Medications   Patient reports taking the following new medications none     History of Present Illness       Reason for visit:  Left rib pain  Symptom onset:  3-7 days ago    She eats 2-3 servings of fruits and vegetables daily.She consumes 0 sweetened beverage(s) daily.She exercises with enough effort to increase her heart rate 9 or less minutes per day.  She exercises with enough effort to increase her heart rate 3 or less days per week.   She is taking medications regularly.     Pain History:  When did you first notice your pain? May 30th   Have you seen anyone else for your pain? No  How has your pain affected your ability to work? Not currently working - unrelated to pain  Where in your body do you have pain?  Patient had left rib pain    Patient was  grocery shopping on May 30th when she bent over the big box to grab a watermelon and felt a pop/crack in her ribs. Patient felt pain instantly. Pain is still present and worsening day by day. Pain present when moving, coughing and taking deep breaths. Particularly bad when repositioning or rolling in bed.   Patient is 0/10 pain sitting not moving but if she coughs the pain is 10/10.  Denies bruising/swelling. Patient states its hard to breathe a lot of the time because of this pain. She has trouble taking deep breaths. No fevers. Intermittent nonproductive cough that she was treated for with prednisone in April and Augmentin in May. This has been improving and is not new since the injury.     Review of Systems  Constitutional, HEENT, cardiovascular, pulmonary, gi and gu systems are negative, except as otherwise noted.      Objective    /79 (BP Location: Left arm, Patient Position: Sitting, Cuff Size: Adult Large)   Pulse 81   Temp 97.3  F (36.3  C) (Temporal)   Resp 20   Ht 1.524 m (5')   Wt 94.6 kg (208 lb 9.6 oz)   SpO2 97%   BMI 40.74 kg/m    Body mass index is 40.74 kg/m .    Physical Exam   GENERAL: alert and no distress  NECK: no adenopathy, no asymmetry, masses, or scars  RESP: lungs clear to auscultation - no rales, rhonchi or wheezes  CV: regular rate and rhythm, normal S1 S2, no S3 or S4, no murmur, click or rub, no peripheral edema  ABDOMEN: soft, nontender, no hepatosplenomegaly, no masses and bowel sounds normal  MS: Tenderness to palpation of left anterior chest wall.   SKIN: no suspicious lesions or rashes  PSYCH: mentation appears normal, affect normal/bright    Xray - Reviewed and interpreted by me.   No pleural effusion, pneumothorax, or abnormal area of consolidation. Minimal  opacity adjacent to the left heart border may be a pericardial fat  pad. Early pneumonia or contusion thought be less likely. Unable to  clearly identify a rib fracture.  Called patient to discuss results.      Signed Electronically by: KRIS Vogel CNP

## 2024-06-05 NOTE — TELEPHONE ENCOUNTER
"  Reason for Disposition   MODERATE pain (e.g., interferes with normal activities) and high-risk adult (e.g., age > 60 years, osteoporosis, chronic steroid use)    Additional Information   Negative: Major injury from dangerous force or speed (e.g., MVA, fall > 10 feet or 3 meters)   Negative: Bullet wound, knife wound or other penetrating object   Negative: Puncture wound that sounds life-threatening to the triager   Negative: SEVERE difficulty breathing (e.g., struggling for each breath, speaks in single words)   Negative: Major bleeding (actively dripping or spurting) that can't be stopped   Negative: Open wound of the chest with sound of moving air (sucking wound) or visible air bubbles   Negative: Shock suspected (e.g., cold/pale/clammy skin, too weak to stand, low BP, rapid pulse)   Negative: Coughing or spitting up blood   Negative: Bluish (or gray) lips or face   Negative: Unconscious or was unconscious   Negative: Sounds like a life-threatening emergency to the triager   Negative: Chest pain not from an injury   Negative: SEVERE chest pain   Negative: Difficulty breathing and not severe   Negative: Sounds like a serious injury to the triager   Negative: Collarbone is painful and difficulty raising arm   Negative: Can't take a deep breath but no respiratory distress (e.g., hurts to take a deep breath)   Negative: No prior tetanus shots (or is not fully vaccinated) and any wound (e.g., cut or scrape)   Negative: Patient is confused or is an unreliable provider of information (e.g., dementia, severe intellectual disability, alcohol intoxication)   Negative: Suspicious history for the injury   Negative: Large swelling or bruise and size > palm of person's hand    Answer Assessment - Initial Assessment Questions  1. MECHANISM: \"How did the injury happen?\"      I was at [employer] 2 days ago, and I fell against wooden boxes, and hurt my ribs  2. ONSET: \"When did the injury happen?\" (Minutes or hours ago)      2 " "days ago  3. LOCATION: \"Where on the chest is the injury located?\"      ribs  4. APPEARANCE: \"What does the injury look like?\"      Normal  5. BLEEDING: \"Is there any bleeding now? If Yes, ask: How long has it been bleeding?\"      denies  6. SEVERITY: \"Any difficulty with breathing?\"      denies  7. SIZE: For cuts, bruises, or swelling, ask: \"How large is it?\" (e.g., inches or centimeters)      deneis  8. PAIN: \"Is there pain?\" If Yes, ask: \"How bad is the pain?\"   (e.g., Scale 1-10; or mild, moderate, severe)      Yes it is pretty painful, about a 6/10, when I take a deep breath in mostly  9. TETANUS: For any breaks in the skin, ask: \"When was the last tetanus booster?\"      NA    SEE IN OFFICE TODAY OR TOMORROW:   Appointment made for today, advised to call us back if pain gets worse before then.  Patient stated understanding and agreeable with the plan of care.     Jennifer WILLIAMSON RN  Triage Nurse  [facility]    Protocols used: Chest Injury-A-[place]    "

## 2024-06-06 ENCOUNTER — ANCILLARY PROCEDURE (OUTPATIENT)
Dept: GENERAL RADIOLOGY | Facility: CLINIC | Age: 64
End: 2024-06-06
Payer: COMMERCIAL

## 2024-06-06 DIAGNOSIS — R07.81 RIB PAIN ON LEFT SIDE: ICD-10-CM

## 2024-06-06 PROCEDURE — 71046 X-RAY EXAM CHEST 2 VIEWS: CPT | Mod: TC | Performed by: RADIOLOGY

## 2024-06-17 PROBLEM — Z71.89 ADVANCED DIRECTIVES, COUNSELING/DISCUSSION: Status: RESOLVED | Noted: 2017-04-13 | Resolved: 2024-06-17

## 2024-09-06 ENCOUNTER — TELEPHONE (OUTPATIENT)
Dept: FAMILY MEDICINE | Facility: CLINIC | Age: 64
End: 2024-09-06
Payer: COMMERCIAL

## 2024-09-06 NOTE — LETTER
September 6, 2024    Aziza Adan  [address]  [place] [address]    Dear Aziza Adan,     At [facility] we care about your health and are committed to providing quality patient care.    Which includes staying current on preventive cancer screenings.  You can increase your chances of finding and treating cancers through regular screenings.      Our records indicate you may be due for the following preventive screening(s):  Breast Cancer Screening - Mammogram  Physical Annual Wellness Visit      Topic Date Due    Zoster (Shingles) Vaccine (1 of 2) Never done    Pneumococcal Vaccine (1 of 1 - PCV) Never done    Flu Vaccine (1) 09/01/2024    COVID-19 Vaccine (3 - 2023-24 season) 09/01/2024       To schedule an appointment or discuss this screening further, you may contact us by phone at the [facility] at [phone] or online through the patient portal/[email or web address] @ [email or web address]    If you have had any of the screenings listed above at another facility, please call us so that we may update your chart.      Your partners in health,      Quality Committee at [facility]

## 2024-09-06 NOTE — TELEPHONE ENCOUNTER
Patient Quality Outreach    Patient is due for the following:   Breast Cancer Screening - Mammogram  Physical Annual Wellness Visit      Topic Date Due    Zoster (Shingles) Vaccine (1 of 2) Never done    Pneumococcal Vaccine (1 of 1 - PCV) Never done    Flu Vaccine (1) 09/01/2024    COVID-19 Vaccine (3 - 2023-24 season) 09/01/2024       Next Steps:   Schedule a Annual Wellness Visit    Type of outreach:    Sent letter.      Questions for provider review:    None           Karina Menendez MA

## 2025-04-03 ENCOUNTER — TELEPHONE (OUTPATIENT)
Dept: FAMILY MEDICINE | Facility: CLINIC | Age: 65
End: 2025-04-03
Payer: COMMERCIAL

## 2025-04-03 NOTE — LETTER
Aziza Adan  [address]  [place] [address]    Dear Aziza,    At [facility] we care about your health and are committed to providing quality patient care.     Here is a list of Health Maintenance topics that are due now or due soon:  Health Maintenance Due   Topic Date Due    Pneumococcal Vaccine: 50+ Years (1 of 1 - PCV) Never done    ZOSTER IMMUNIZATION (1 of 2) Never done    RSV VACCINE (1 - Risk 60-74 years 1-dose series) Never done    MAMMO SCREENING  01/17/2019    COLORECTAL CANCER SCREENING  03/06/2020    ADVANCE CARE PLANNING  04/13/2022    MEDICARE ANNUAL WELLNESS VISIT  08/25/2023    DK ASSESSMENT  07/26/2024    INFLUENZA VACCINE (1) 09/01/2024    COVID-19 Vaccine (3 - 2024-25 season) 09/01/2024    PHQ-9  12/05/2024    LIPID  01/26/2025    TSH W/FREE T4 REFLEX  01/26/2025    FALL RISK ASSESSMENT  01/26/2025    ANNUAL REVIEW OF HM ORDERS  04/11/2025        We are recommending that you:  Schedule a WELLNESS (Preventative/Physical) APPOINTMENT with your primary care provider. If you go elsewhere for your wellness appointments then please disregard this reminder    ,   Schedule a MAMMOGRAM which is due.    1 in 8 women will develop invasive breast cancer during her lifetime and it is the most common non-skin cancer in American women.  EARLY detection, new treatments, and a better understanding of the disease have increased survival rates - the 5 year survival rate in the 1960s was 63% and today it is close to 90%.    If you are under/uninsured, we recommend you contact the [facility]. They offer mammograms at no charge or on a sliding fee charge. You can schedule with them at [phone]. Please have them send us the results.      Please disregard this reminder if you have had this exam elsewhere within the last year.  It would be helpful for us to have a copy of your mammogram report in your file so that we can best coordinate your care - please contact  us with when your test was done so we can update your record.    , and   Schedule a COLONOSCOPY to assess for colon cancer (due every 10 years or 5 years in higher risk situations.)       Colon cancer is now the second leading cause of cancer-related deaths in the [place] for both men and women and there are over 130,000 new cases and 50,000 deaths per year from colon cancer.  Colonoscopies can prevent 90-95% of these deaths.  Problem lesions can be removed before they ever become cancer.  This test is not only looking for cancer, but also getting rid of precancerious lesions.    If you are under/uninsured, we recommend you contact the [facility] program. [facility] is a free colorectal cancer screening program that provides colonoscopies for eligible under/uninsured [place] men and women. If you are interested in receiving a free colonoscopy, please call [facility] at [phone] (mention code [employer]) to see if you re eligible.     If you do not wish to do a colonoscopy or cannot afford to do one, at this time, there is another option. It is called a FIT test or Fecal Immunochemical Occult Blood Test (take home stool sample kit).  It does not replace the colonoscopy for colorectal cancer screening, but it can detect hidden bleeding in the lower colon.  It does need to be repeated every year and if a positive result is obtained, you would be referred for a colonoscopy.    If you have completed either one of these tests at another facility, please call/respond to this message with the details of when and where the tests were done and if they were normal or not. Or have the records sent to our clinic so that we can best coordinate your care.    To schedule an appointment or discuss this further, you may contact us by phone at the [facility] at [phone] or online through the patient portal/[email or web address] @ [email or web address]    Thank you for trusting [facility]  and we appreciate the opportunity to serve you.  We look forward to supporting your healthcare needs in the future.    Your partners in health,      Quality Committee at [facility]

## 2025-04-03 NOTE — TELEPHONE ENCOUNTER
Patient Quality Outreach    Patient is due for the following:   Colon Cancer Screening  Breast Cancer Screening - Mammogram  Physical Annual Wellness Visit    Action(s) Taken:   Schedule a Annual Wellness Visit    Type of outreach:    Sent letter.    Questions for provider review:    None         Blanca Meehan MA  Chart routed to None.

## 2025-08-02 DIAGNOSIS — E03.9 ACQUIRED HYPOTHYROIDISM: ICD-10-CM

## 2025-08-04 RX ORDER — LEVOTHYROXINE SODIUM 150 UG/1
150 TABLET ORAL DAILY
Qty: 30 TABLET | Refills: 0 | Status: SHIPPED | OUTPATIENT
Start: 2025-08-04